# Patient Record
Sex: MALE | Race: WHITE | NOT HISPANIC OR LATINO | Employment: PART TIME | URBAN - METROPOLITAN AREA
[De-identification: names, ages, dates, MRNs, and addresses within clinical notes are randomized per-mention and may not be internally consistent; named-entity substitution may affect disease eponyms.]

---

## 2018-12-31 ENCOUNTER — HOSPITAL ENCOUNTER (INPATIENT)
Facility: HOSPITAL | Age: 48
LOS: 4 days | Discharge: HOME | DRG: 514 | End: 2019-01-04
Attending: FAMILY MEDICINE | Admitting: INTERNAL MEDICINE
Payer: MEDICAID

## 2018-12-31 DIAGNOSIS — L02.512 ABSCESS OF LEFT MIDDLE FINGER: Primary | ICD-10-CM

## 2018-12-31 DIAGNOSIS — L53.9 ERYTHEMA: ICD-10-CM

## 2018-12-31 DIAGNOSIS — M65.9 TENOSYNOVITIS OF FINGER: ICD-10-CM

## 2018-12-31 PROBLEM — Z72.0 TOBACCO ABUSE: Status: ACTIVE | Noted: 2018-12-31

## 2018-12-31 LAB
ANION GAP SERPL CALC-SCNC: 11 MMOL/L
APTT BLDCRRT: 27.1 SEC
BASOPHILS # BLD AUTO: 0.02 K/UL
BASOPHILS NFR BLD: 0.2 %
BUN SERPL-MCNC: 10 MG/DL
CALCIUM SERPL-MCNC: 9.3 MG/DL
CHLORIDE SERPL-SCNC: 108 MMOL/L
CO2 SERPL-SCNC: 22 MMOL/L
CREAT SERPL-MCNC: 0.8 MG/DL
DIFFERENTIAL METHOD: ABNORMAL
EOSINOPHIL # BLD AUTO: 0.3 K/UL
EOSINOPHIL NFR BLD: 3.1 %
ERYTHROCYTE [DISTWIDTH] IN BLOOD BY AUTOMATED COUNT: 13.5 %
EST. GFR  (AFRICAN AMERICAN): >60 ML/MIN/1.73 M^2
EST. GFR  (NON AFRICAN AMERICAN): >60 ML/MIN/1.73 M^2
GLUCOSE SERPL-MCNC: 93 MG/DL
HCT VFR BLD AUTO: 40.6 %
HGB BLD-MCNC: 13.6 G/DL
INR PPP: 0.9
LYMPHOCYTES # BLD AUTO: 1.2 K/UL
LYMPHOCYTES NFR BLD: 11.7 %
MCH RBC QN AUTO: 30 PG
MCHC RBC AUTO-ENTMCNC: 33.5 G/DL
MCV RBC AUTO: 89 FL
MONOCYTES # BLD AUTO: 1.1 K/UL
MONOCYTES NFR BLD: 10.8 %
NEUTROPHILS # BLD AUTO: 7.3 K/UL
NEUTROPHILS NFR BLD: 74.2 %
PLATELET # BLD AUTO: 217 K/UL
PMV BLD AUTO: 9 FL
POTASSIUM SERPL-SCNC: 4.3 MMOL/L
PROTHROMBIN TIME: 9.6 SEC
RBC # BLD AUTO: 4.54 M/UL
SODIUM SERPL-SCNC: 141 MMOL/L
WBC # BLD AUTO: 9.87 K/UL

## 2018-12-31 PROCEDURE — 63600175 PHARM REV CODE 636 W HCPCS: Performed by: INTERNAL MEDICINE

## 2018-12-31 PROCEDURE — 25000003 PHARM REV CODE 250: Performed by: PHYSICIAN ASSISTANT

## 2018-12-31 PROCEDURE — 90714 TD VACC NO PRESV 7 YRS+ IM: CPT | Performed by: PHYSICIAN ASSISTANT

## 2018-12-31 PROCEDURE — 63600175 PHARM REV CODE 636 W HCPCS: Performed by: PHYSICIAN ASSISTANT

## 2018-12-31 PROCEDURE — 96375 TX/PRO/DX INJ NEW DRUG ADDON: CPT | Mod: 59

## 2018-12-31 PROCEDURE — 96372 THER/PROPH/DIAG INJ SC/IM: CPT | Mod: 59

## 2018-12-31 PROCEDURE — 80048 BASIC METABOLIC PNL TOTAL CA: CPT

## 2018-12-31 PROCEDURE — 85610 PROTHROMBIN TIME: CPT

## 2018-12-31 PROCEDURE — 90471 IMMUNIZATION ADMIN: CPT | Performed by: PHYSICIAN ASSISTANT

## 2018-12-31 PROCEDURE — 99285 EMERGENCY DEPT VISIT HI MDM: CPT | Mod: 25

## 2018-12-31 PROCEDURE — 25000003 PHARM REV CODE 250: Performed by: INTERNAL MEDICINE

## 2018-12-31 PROCEDURE — S0020 INJECTION, BUPIVICAINE HYDRO: HCPCS | Performed by: PHYSICIAN ASSISTANT

## 2018-12-31 PROCEDURE — 63600175 PHARM REV CODE 636 W HCPCS: Performed by: NURSE PRACTITIONER

## 2018-12-31 PROCEDURE — 85730 THROMBOPLASTIN TIME PARTIAL: CPT

## 2018-12-31 PROCEDURE — 11000001 HC ACUTE MED/SURG PRIVATE ROOM

## 2018-12-31 PROCEDURE — 85025 COMPLETE CBC W/AUTO DIFF WBC: CPT

## 2018-12-31 PROCEDURE — 96365 THER/PROPH/DIAG IV INF INIT: CPT | Mod: 59

## 2018-12-31 PROCEDURE — 25000003 PHARM REV CODE 250: Performed by: NURSE PRACTITIONER

## 2018-12-31 PROCEDURE — 10061 I&D ABSCESS COMP/MULTIPLE: CPT

## 2018-12-31 PROCEDURE — 25500020 PHARM REV CODE 255: Performed by: FAMILY MEDICINE

## 2018-12-31 RX ORDER — VANCOMYCIN HCL IN 5 % DEXTROSE 1G/250ML
1000 PLASTIC BAG, INJECTION (ML) INTRAVENOUS
Status: COMPLETED | OUTPATIENT
Start: 2018-12-31 | End: 2018-12-31

## 2018-12-31 RX ORDER — BUPIVACAINE HYDROCHLORIDE 5 MG/ML
5 INJECTION, SOLUTION EPIDURAL; INTRACAUDAL
Status: COMPLETED | OUTPATIENT
Start: 2018-12-31 | End: 2018-12-31

## 2018-12-31 RX ORDER — IBUPROFEN 200 MG
1 TABLET ORAL DAILY
Status: DISCONTINUED | OUTPATIENT
Start: 2018-12-31 | End: 2019-01-04 | Stop reason: HOSPADM

## 2018-12-31 RX ORDER — MORPHINE SULFATE 2 MG/ML
2 INJECTION, SOLUTION INTRAMUSCULAR; INTRAVENOUS EVERY 4 HOURS PRN
Status: DISCONTINUED | OUTPATIENT
Start: 2018-12-31 | End: 2018-12-31 | Stop reason: SDUPTHER

## 2018-12-31 RX ORDER — HYDROMORPHONE HYDROCHLORIDE 2 MG/ML
1 INJECTION, SOLUTION INTRAMUSCULAR; INTRAVENOUS; SUBCUTANEOUS
Status: COMPLETED | OUTPATIENT
Start: 2018-12-31 | End: 2018-12-31

## 2018-12-31 RX ORDER — ACETAMINOPHEN 325 MG/1
650 TABLET ORAL EVERY 8 HOURS PRN
Status: DISCONTINUED | OUTPATIENT
Start: 2018-12-31 | End: 2019-01-04 | Stop reason: HOSPADM

## 2018-12-31 RX ORDER — LEVOFLOXACIN 5 MG/ML
500 INJECTION, SOLUTION INTRAVENOUS
Status: DISCONTINUED | OUTPATIENT
Start: 2018-12-31 | End: 2019-01-02

## 2018-12-31 RX ORDER — ONDANSETRON 2 MG/ML
4 INJECTION INTRAMUSCULAR; INTRAVENOUS
Status: COMPLETED | OUTPATIENT
Start: 2018-12-31 | End: 2018-12-31

## 2018-12-31 RX ORDER — HYDROMORPHONE HYDROCHLORIDE 2 MG/ML
1 INJECTION, SOLUTION INTRAMUSCULAR; INTRAVENOUS; SUBCUTANEOUS EVERY 4 HOURS PRN
Status: DISCONTINUED | OUTPATIENT
Start: 2018-12-31 | End: 2019-01-01

## 2018-12-31 RX ORDER — ONDANSETRON 2 MG/ML
4 INJECTION INTRAMUSCULAR; INTRAVENOUS EVERY 8 HOURS PRN
Status: DISCONTINUED | OUTPATIENT
Start: 2018-12-31 | End: 2019-01-02

## 2018-12-31 RX ADMIN — HYDROMORPHONE HYDROCHLORIDE 1 MG: 2 INJECTION INTRAMUSCULAR; INTRAVENOUS; SUBCUTANEOUS at 11:12

## 2018-12-31 RX ADMIN — VANCOMYCIN HYDROCHLORIDE 750 MG: 750 INJECTION, POWDER, LYOPHILIZED, FOR SOLUTION INTRAVENOUS at 11:12

## 2018-12-31 RX ADMIN — VANCOMYCIN HYDROCHLORIDE 1000 MG: 1 INJECTION, POWDER, LYOPHILIZED, FOR SOLUTION INTRAVENOUS at 11:12

## 2018-12-31 RX ADMIN — HYDROMORPHONE HYDROCHLORIDE 1 MG: 2 INJECTION INTRAMUSCULAR; INTRAVENOUS; SUBCUTANEOUS at 07:12

## 2018-12-31 RX ADMIN — BUPIVACAINE HYDROCHLORIDE 25 MG: 5 INJECTION, SOLUTION EPIDURAL; INTRACAUDAL; PERINEURAL at 02:12

## 2018-12-31 RX ADMIN — IOHEXOL 50 ML: 350 INJECTION, SOLUTION INTRAVENOUS at 12:12

## 2018-12-31 RX ADMIN — ONDANSETRON 4 MG: 2 INJECTION, SOLUTION INTRAMUSCULAR; INTRAVENOUS at 11:12

## 2018-12-31 RX ADMIN — ONDANSETRON 4 MG: 2 INJECTION INTRAMUSCULAR; INTRAVENOUS at 10:12

## 2018-12-31 RX ADMIN — LEVOFLOXACIN 500 MG: 500 INJECTION, SOLUTION INTRAVENOUS at 03:12

## 2018-12-31 RX ADMIN — ACETAMINOPHEN 650 MG: 325 TABLET ORAL at 10:12

## 2018-12-31 RX ADMIN — CLOSTRIDIUM TETANI TOXOID ANTIGEN (FORMALDEHYDE INACTIVATED) AND CORYNEBACTERIUM DIPHTHERIAE TOXOID ANTIGEN (FORMALDEHYDE INACTIVATED) 0.5 ML: 5; 2 INJECTION, SUSPENSION INTRAMUSCULAR at 02:12

## 2018-12-31 NOTE — SUBJECTIVE & OBJECTIVE
History reviewed. No pertinent past medical history.    Past Surgical History:   Procedure Laterality Date    HERNIA REPAIR         Review of patient's allergies indicates:   Allergen Reactions    Pcn [penicillins] Hives    Sulfa (sulfonamide antibiotics) Hives       No current facility-administered medications on file prior to encounter.      No current outpatient medications on file prior to encounter.     Family History     None        Tobacco Use    Smoking status: Current Every Day Smoker     Packs/day: 1.00     Types: Cigarettes    Smokeless tobacco: Never Used   Substance and Sexual Activity    Alcohol use: No     Frequency: Never    Drug use: No    Sexual activity: Not on file     Review of Systems   Constitutional: Negative for chills, diaphoresis, fatigue and fever.   HENT: Negative for drooling, ear pain, rhinorrhea and sore throat.    Eyes: Negative.    Respiratory: Negative for cough, shortness of breath and wheezing.    Cardiovascular: Negative for palpitations and leg swelling.   Gastrointestinal: Negative for abdominal pain, constipation, diarrhea and nausea.   Endocrine: Negative.    Genitourinary: Negative for dysuria, hematuria and urgency.   Musculoskeletal: Positive for joint swelling.        Left middle finger swelling and tenderness     Skin: Negative for color change and wound.   Allergic/Immunologic: Negative.    Neurological: Negative for dizziness, syncope and speech difficulty.   Hematological: Negative.    Psychiatric/Behavioral: Negative.      Objective:     Vital Signs (Most Recent):  Temp: 97.9 °F (36.6 °C) (12/31/18 1040)  Pulse: 73 (12/31/18 1140)  Resp: 18 (12/31/18 1140)  BP: 128/83 (12/31/18 1140)  SpO2: 97 % (12/31/18 1140) Vital Signs (24h Range):  Temp:  [97.9 °F (36.6 °C)] 97.9 °F (36.6 °C)  Pulse:  [73-81] 73  Resp:  [18-20] 18  SpO2:  [97 %-98 %] 97 %  BP: (128-148)/(83) 128/83     Weight: 54 kg (119 lb 0.8 oz)  Body mass index is 19.21 kg/m².    Physical Exam    Constitutional: He is oriented to person, place, and time. He appears well-developed and well-nourished. No distress.   HENT:   Head: Normocephalic and atraumatic.   Eyes: EOM are normal.   Neck: Normal range of motion. Neck supple.   Cardiovascular: Normal rate, regular rhythm and normal heart sounds.   Pulmonary/Chest: Effort normal and breath sounds normal. No respiratory distress.   Abdominal: Soft. Bowel sounds are normal. He exhibits no distension. There is no tenderness.   Musculoskeletal: Normal range of motion. He exhibits no edema.   Left middle finger swelling and tenderness.      Neurological: He is alert and oriented to person, place, and time.   Skin: Skin is dry.   Nursing note and vitals reviewed.        CRANIAL NERVES     CN III, IV, VI   Extraocular motions are normal.        Significant Labs:   CBC:   Recent Labs   Lab 12/31/18  1107   WBC 9.87   HGB 13.6*   HCT 40.6        CMP:   Recent Labs   Lab 12/31/18  1107      K 4.3      CO2 22*   GLU 93   BUN 10   CREATININE 0.8   CALCIUM 9.3   ANIONGAP 11   EGFRNONAA >60       Significant Imaging:   Imaging Results          CT Hand With Contrast Left (Final result)  Result time 12/31/18 13:06:11    Final result by Dennis Gutierrez Jr., MD (12/31/18 13:06:11)                 Impression:      No acute bone findings.  Nothing obvious for osteomyelitis.  Two.  Focal area of soft tissue swelling along the ventral aspect of the long finger, with possibilities as above.    All CT scans at this facility are performed  using dose modulation techniques as appropriate to performed exam including the following:  automated exposure control; adjustment of mA and/or kV according to the patients size (this includes techniques or standardized protocols for targeted exams where dose is matched to indication/reason for exam: i.e. extremities or head);  iterative reconstruction technique.      Electronically signed by: Dennis Gutierrez  MD  Date:    12/31/2018  Time:    13:06             Narrative:    EXAMINATION:  CT HAND WITH CONTRAST LEFT    CLINICAL HISTORY:  Hand erythema, swelling, cellulitis suspected;r/o flexor tenosynovitis left 3rd finger;    TECHNIQUE:  Postcontrast images of the left hand/long finger were obtained.  50 cc Omnipaque 350 was administered.  Multiplanar reconstruction images were produced.    COMPARISON:  None    FINDINGS:  Bone alignment of the wrist and hand appears anatomic.  No fractures.  No bone destruction.  Carpal bones appear intact.  Benign area of sclerosis in the midpole of the scaphoid bone, likely a benign bone island.  No findings of obvious osteomyelitis.    Focal area of soft tissue prominence along the ventral portion of the middle phalanx of the long finger.  Soft tissue swelling is noted and at an anterior to the flexor tendon at this region.  Focal area of tenosynovitis is possible.  Soft tissue swelling, extends over length of approximately 1.5 cm.  Small phlegmon/early abscess is also possible.  Imaging 84 series 105, image 54, series 3 if additional imaging is indicated, consider MRI evaluation.

## 2018-12-31 NOTE — ED PROVIDER NOTES
"Encounter Date: 12/31/2018       History     Chief Complaint   Patient presents with    Hand Pain     Pt states,"I have something wrong with my left middle finger, it's swollen and hurting."     The history is provided by the patient.   Abscess    This is a new problem. The current episode started yesterday. The problem occurs rarely. The problem has been rapidly worsening. Affected Location: left 3rd finger. The pain is at a severity of 5/10. The abscess is characterized by redness, painfulness and swelling. Pertinent negatives include no fever, no diarrhea, no vomiting and no sore throat.     Review of patient's allergies indicates:   Allergen Reactions    Pcn [penicillins] Hives    Sulfa (sulfonamide antibiotics) Hives     No past medical history on file.  No past surgical history on file.  No family history on file.  Social History     Tobacco Use    Smoking status: Not on file   Substance Use Topics    Alcohol use: Not on file    Drug use: Not on file     Review of Systems   Constitutional: Negative for diaphoresis and fever.   HENT: Negative for sore throat.    Eyes: Negative for photophobia and redness.   Respiratory: Negative for shortness of breath.    Cardiovascular: Negative for chest pain.   Gastrointestinal: Negative for abdominal pain, constipation, diarrhea, nausea and vomiting.   Endocrine: Negative for polydipsia and polyphagia.   Genitourinary: Negative for dysuria and frequency.   Musculoskeletal: Negative for back pain.   Skin: Negative for rash.   Neurological: Negative for weakness.   Hematological: Does not bruise/bleed easily.   Psychiatric/Behavioral: The patient is not nervous/anxious.    All other systems reviewed and are negative.      Physical Exam     Initial Vitals [12/31/18 1040]   BP Pulse Resp Temp SpO2   (!) 148/83 81 20 97.9 °F (36.6 °C) 98 %      MAP       --         Physical Exam    Nursing note and vitals reviewed.  Constitutional: He appears well-developed and " well-nourished.   HENT:   Head: Normocephalic and atraumatic.   Right Ear: External ear normal.   Left Ear: External ear normal.   Nose: Nose normal.   Mouth/Throat: Oropharynx is clear and moist.   Eyes: Conjunctivae and EOM are normal. Pupils are equal, round, and reactive to light.   Neck: Normal range of motion. Neck supple.   Cardiovascular: Normal rate, regular rhythm, normal heart sounds and intact distal pulses.   Pulmonary/Chest: Breath sounds normal. No respiratory distress. He has no wheezes. He has no rhonchi. He has no rales.   Abdominal: Soft. Bowel sounds are normal. He exhibits no distension. There is no tenderness. There is no rebound and no guarding.   Musculoskeletal:        Left hand: He exhibits decreased range of motion, tenderness and swelling. He exhibits normal two-point discrimination, normal capillary refill, no deformity and no laceration. Normal sensation noted. Normal strength noted.   2 cm x 1 cm area of Fluctuant, erythematous, tender swelling to the palmar aspect of the left 3rd digit;  Pain with passive flexion of the left 3rd finger   Neurological: He is alert and oriented to person, place, and time. He has normal strength.   Skin: Skin is warm and dry.   Psychiatric: He has a normal mood and affect. His behavior is normal. Judgment and thought content normal.         ED Course   I & D - Incision and Drainage  Date/Time: 12/31/2018 2:34 PM  Performed by: MARKO Etienne  Authorized by: Shania Orozco MD   Type: abscess  Body area: upper extremity  Location details: left long finger  Anesthesia: digital block    Anesthesia:  Local Anesthetic: bupivacaine 0.5% without epinephrine  Scalpel size: 11  Incision type: single straight  Complexity: complex  Drainage: pus  Drainage amount: scant  Wound treatment: incision,  deloculation,  drain placed,  expression of material,  wound packed and  drainage  Packing material: 1/4 in gauze  Complications: No  Patient tolerance:  Patient tolerated the procedure well with no immediate complications        Labs Reviewed - No data to display       Imaging Results    None                               Clinical Impression:   The primary encounter diagnosis was Abscess of left middle finger. A diagnosis of Tenosynovitis of finger was also pertinent to this visit.                             MARKO Etienne  12/31/18 [id number]

## 2018-12-31 NOTE — H&P
"[facility] Hospital Medicine  History & Physical    Patient Name: Aniceto Johansen  MRN: [id number]  Admission Date: 12/31/2018  Attending Physician: Shania Orozco MD   Primary Care Provider: Primary Doctor No      Patient information was obtained from patient and ER records.     Subjective:     Principal Problem:Abscess of left middle finger    Chief Complaint:   Chief Complaint   Patient presents with    Hand Pain     Pt states,"I have something wrong with my left middle finger, it's swollen and hurting."        HPI: Aniceto Johansen is a 48 year old male with history of tobacco abuse who presents to ED with left middle swelling and pain. Symptoms began one day ago and has progressively worsened. He denies injury or trauma to affected extremity. He smokes 1 ppd. Denies ETOH or recreational use.     In ED, CT Hand reflected middle finger soft tissue swelling involving anterior tendon with possible early abscess. He is about to undergo bedside I&D. Orthopedic surgery has been consulted.       History reviewed. No pertinent past medical history.    Past Surgical History:   Procedure Laterality Date    HERNIA REPAIR         Review of patient's allergies indicates:   Allergen Reactions    Pcn [penicillins] Hives    Sulfa (sulfonamide antibiotics) Hives       No current facility-administered medications on file prior to encounter.      No current outpatient medications on file prior to encounter.     Family History     None        Tobacco Use    Smoking status: Current Every Day Smoker     Packs/day: 1.00     Types: Cigarettes    Smokeless tobacco: Never Used   Substance and Sexual Activity    Alcohol use: No     Frequency: Never    Drug use: No    Sexual activity: Not on file     Review of Systems   Constitutional: Negative for chills, diaphoresis, fatigue and fever.   HENT: Negative for drooling, ear pain, rhinorrhea and sore throat.    Eyes: Negative.    Respiratory: Negative for " cough, shortness of breath and wheezing.    Cardiovascular: Negative for palpitations and leg swelling.   Gastrointestinal: Negative for abdominal pain, constipation, diarrhea and nausea.   Endocrine: Negative.    Genitourinary: Negative for dysuria, hematuria and urgency.   Musculoskeletal: Positive for joint swelling.        Left middle finger swelling and tenderness     Skin: Negative for color change and wound.   Allergic/Immunologic: Negative.    Neurological: Negative for dizziness, syncope and speech difficulty.   Hematological: Negative.    Psychiatric/Behavioral: Negative.      Objective:     Vital Signs (Most Recent):  Temp: 97.9 °F (36.6 °C) (12/31/18 1040)  Pulse: 73 (12/31/18 1140)  Resp: 18 (12/31/18 1140)  BP: 128/83 (12/31/18 1140)  SpO2: 97 % (12/31/18 1140) Vital Signs (24h Range):  Temp:  [97.9 °F (36.6 °C)] 97.9 °F (36.6 °C)  Pulse:  [73-81] 73  Resp:  [18-20] 18  SpO2:  [97 %-98 %] 97 %  BP: (128-148)/(83) 128/83     Weight: 54 kg (119 lb 0.8 oz)  Body mass index is 19.21 kg/m².    Physical Exam   Constitutional: He is oriented to person, place, and time. He appears well-developed and well-nourished. No distress.   HENT:   Head: Normocephalic and atraumatic.   Eyes: EOM are normal.   Neck: Normal range of motion. Neck supple.   Cardiovascular: Normal rate, regular rhythm and normal heart sounds.   Pulmonary/Chest: Effort normal and breath sounds normal. No respiratory distress.   Abdominal: Soft. Bowel sounds are normal. He exhibits no distension. There is no tenderness.   Musculoskeletal: Normal range of motion. He exhibits no edema.   Left middle finger swelling and tenderness.      Neurological: He is alert and oriented to person, place, and time.   Skin: Skin is dry.   Nursing note and vitals reviewed.        CRANIAL NERVES     CN III, IV, VI   Extraocular motions are normal.        Significant Labs:   CBC:   Recent Labs   Lab 12/31/18  1107   WBC 9.87   HGB 13.6*   HCT 40.6         CMP:   Recent Labs   Lab 12/31/18  1107      K 4.3      CO2 22*   GLU 93   BUN 10   CREATININE 0.8   CALCIUM 9.3   ANIONGAP 11   EGFRNONAA >60       Significant Imaging:   Imaging Results          CT Hand With Contrast Left (Final result)  Result time 12/31/18 13:06:11    Final result by Dennis Gutierrez Jr., MD (12/31/18 13:06:11)                 Impression:      No acute bone findings.  Nothing obvious for osteomyelitis.  Two.  Focal area of soft tissue swelling along the ventral aspect of the long finger, with possibilities as above.    All CT scans at this facility are performed  using dose modulation techniques as appropriate to performed exam including the following:  automated exposure control; adjustment of mA and/or kV according to the patients size (this includes techniques or standardized protocols for targeted exams where dose is matched to indication/reason for exam: i.e. extremities or head);  iterative reconstruction technique.      Electronically signed by: Dennis Gutierrez MD  Date:    12/31/2018  Time:    13:06             Narrative:    EXAMINATION:  CT HAND WITH CONTRAST LEFT    CLINICAL HISTORY:  Hand erythema, swelling, cellulitis suspected;r/o flexor tenosynovitis left 3rd finger;    TECHNIQUE:  Postcontrast images of the left hand/long finger were obtained.  50 cc Omnipaque 350 was administered.  Multiplanar reconstruction images were produced.    COMPARISON:  None    FINDINGS:  Bone alignment of the wrist and hand appears anatomic.  No fractures.  No bone destruction.  Carpal bones appear intact.  Benign area of sclerosis in the midpole of the scaphoid bone, likely a benign bone island.  No findings of obvious osteomyelitis.    Focal area of soft tissue prominence along the ventral portion of the middle phalanx of the long finger.  Soft tissue swelling is noted and at an anterior to the flexor tendon at this region.  Focal area of tenosynovitis is possible.  Soft tissue  swelling, extends over length of approximately 1.5 cm.  Small phlegmon/early abscess is also possible.  Imaging 84 series 105, image 54, series 3 if additional imaging is indicated, consider MRI evaluation.                                  Assessment/Plan:     * Abscess of left middle finger    -CT showed concern for early tenosynovitis and abscess. Currently having bedside I&D. Await wound cultures  -Continue Vancomycin. Start Levaquin  -neurovascular checks  -Orthopedic Surgery following  -pain managment     Tobacco abuse    -tobacco abuse         VTE Risk Mitigation (From admission, onward)    Ambulation  SCD          Rivka Huber NP  Department of Hospital Medicine   [facility]

## 2018-12-31 NOTE — HPI
Aniceto Johansen is a 48 year old male with history of tobacco abuse who presents to ED with left middle swelling and pain. Symptoms began one day ago and has progressively worsened. He denies injury or trauma to affected extremity. He smokes 1 ppd. Denies ETOH or recreational use.     In ED, CT Hand reflected middle finger soft tissue swelling involving anterior tendon with possible early abscess. He is about to undergo bedside I&D. Orthopedic surgery has been consulted.

## 2018-12-31 NOTE — ASSESSMENT & PLAN NOTE
-CT showed concern for early tenosynovitis and abscess. Currently having bedside I&D. Await wound cultures  -Continue Vancomycin. Start Levaquin  -neurovascular checks  -Orthopedic Surgery following  -pain managment

## 2018-12-31 NOTE — PLAN OF CARE
Problem: Adult Inpatient Plan of Care  Goal: Plan of Care Review  Outcome: Ongoing (interventions implemented as appropriate)  Fall precautions maintained. Pt free from falls/injuries.  Patient complains of pain. Pain controlled with PRN meds.  Antibiotics given as prescribed.  Ambulates and repositions independently.  Plan of care and medications discussed with patient.  Patient verbalized understanding.  Bed locked and low, call bell within reach.  Chart check done. Will continue to monitor.

## 2019-01-01 ENCOUNTER — ANESTHESIA (OUTPATIENT)
Dept: SURGERY | Facility: HOSPITAL | Age: 49
DRG: 514 | End: 2019-01-01
Payer: MEDICAID

## 2019-01-01 ENCOUNTER — ANESTHESIA EVENT (OUTPATIENT)
Dept: SURGERY | Facility: HOSPITAL | Age: 49
DRG: 514 | End: 2019-01-01
Payer: MEDICAID

## 2019-01-01 LAB — VANCOMYCIN TROUGH SERPL-MCNC: 7.3 UG/ML

## 2019-01-01 PROCEDURE — 25000003 PHARM REV CODE 250: Performed by: NURSE ANESTHETIST, CERTIFIED REGISTERED

## 2019-01-01 PROCEDURE — 26011 DRAINAGE OF FINGER ABSCESS: CPT | Mod: F2,,, | Performed by: ORTHOPAEDIC SURGERY

## 2019-01-01 PROCEDURE — 63600175 PHARM REV CODE 636 W HCPCS: Performed by: PHYSICIAN ASSISTANT

## 2019-01-01 PROCEDURE — 63600175 PHARM REV CODE 636 W HCPCS: Performed by: NURSE ANESTHETIST, CERTIFIED REGISTERED

## 2019-01-01 PROCEDURE — 37000008 HC ANESTHESIA 1ST 15 MINUTES: Performed by: ORTHOPAEDIC SURGERY

## 2019-01-01 PROCEDURE — 99233 SBSQ HOSP IP/OBS HIGH 50: CPT | Mod: 57,,, | Performed by: ORTHOPAEDIC SURGERY

## 2019-01-01 PROCEDURE — 87070 CULTURE OTHR SPECIMN AEROBIC: CPT | Mod: 59

## 2019-01-01 PROCEDURE — 11043 PR DEBRIDEMENT, SKIN, SUB-Q TISSUE,MUSCLE,=<20 SQ CM: ICD-10-PCS | Mod: 51,,, | Performed by: ORTHOPAEDIC SURGERY

## 2019-01-01 PROCEDURE — 63600175 PHARM REV CODE 636 W HCPCS: Performed by: NURSE PRACTITIONER

## 2019-01-01 PROCEDURE — 36415 COLL VENOUS BLD VENIPUNCTURE: CPT

## 2019-01-01 PROCEDURE — 11000001 HC ACUTE MED/SURG PRIVATE ROOM

## 2019-01-01 PROCEDURE — A9585 GADOBUTROL INJECTION: HCPCS | Performed by: INTERNAL MEDICINE

## 2019-01-01 PROCEDURE — 83036 HEMOGLOBIN GLYCOSYLATED A1C: CPT

## 2019-01-01 PROCEDURE — 71000033 HC RECOVERY, INTIAL HOUR: Performed by: ORTHOPAEDIC SURGERY

## 2019-01-01 PROCEDURE — 11043 DBRDMT MUSC&/FSCA 1ST 20/<: CPT | Mod: 51,,, | Performed by: ORTHOPAEDIC SURGERY

## 2019-01-01 PROCEDURE — 25000003 PHARM REV CODE 250: Performed by: NURSE PRACTITIONER

## 2019-01-01 PROCEDURE — 63600175 PHARM REV CODE 636 W HCPCS: Performed by: HOSPITALIST

## 2019-01-01 PROCEDURE — 25000003 PHARM REV CODE 250: Performed by: INTERNAL MEDICINE

## 2019-01-01 PROCEDURE — 26011 PR DRAIN FINGER ABSCESS,COMPLICATED: ICD-10-PCS | Mod: F2,,, | Performed by: ORTHOPAEDIC SURGERY

## 2019-01-01 PROCEDURE — 25000003 PHARM REV CODE 250: Performed by: HOSPITALIST

## 2019-01-01 PROCEDURE — 63600175 PHARM REV CODE 636 W HCPCS: Performed by: ANESTHESIOLOGY

## 2019-01-01 PROCEDURE — 36000706: Performed by: ORTHOPAEDIC SURGERY

## 2019-01-01 PROCEDURE — 63600175 PHARM REV CODE 636 W HCPCS: Performed by: INTERNAL MEDICINE

## 2019-01-01 PROCEDURE — 25500020 PHARM REV CODE 255: Performed by: INTERNAL MEDICINE

## 2019-01-01 PROCEDURE — 99233 PR SUBSEQUENT HOSPITAL CARE,LEVL III: ICD-10-PCS | Mod: 57,,, | Performed by: ORTHOPAEDIC SURGERY

## 2019-01-01 PROCEDURE — 87186 SC STD MICRODIL/AGAR DIL: CPT | Mod: 59

## 2019-01-01 PROCEDURE — 36000707: Performed by: ORTHOPAEDIC SURGERY

## 2019-01-01 PROCEDURE — 80202 ASSAY OF VANCOMYCIN: CPT

## 2019-01-01 PROCEDURE — 37000009 HC ANESTHESIA EA ADD 15 MINS: Performed by: ORTHOPAEDIC SURGERY

## 2019-01-01 PROCEDURE — 87077 CULTURE AEROBIC IDENTIFY: CPT

## 2019-01-01 RX ORDER — METOCLOPRAMIDE HYDROCHLORIDE 5 MG/ML
10 INJECTION INTRAMUSCULAR; INTRAVENOUS ONCE
Status: COMPLETED | OUTPATIENT
Start: 2019-01-01 | End: 2019-01-01

## 2019-01-01 RX ORDER — LIDOCAINE HCL/PF 100 MG/5ML
SYRINGE (ML) INTRAVENOUS
Status: DISCONTINUED | OUTPATIENT
Start: 2019-01-01 | End: 2019-01-01

## 2019-01-01 RX ORDER — ROCURONIUM BROMIDE 10 MG/ML
INJECTION, SOLUTION INTRAVENOUS
Status: DISCONTINUED | OUTPATIENT
Start: 2019-01-01 | End: 2019-01-01

## 2019-01-01 RX ORDER — GADOBUTROL 604.72 MG/ML
6 INJECTION INTRAVENOUS
Status: COMPLETED | OUTPATIENT
Start: 2019-01-01 | End: 2019-01-01

## 2019-01-01 RX ORDER — SODIUM CHLORIDE, SODIUM LACTATE, POTASSIUM CHLORIDE, CALCIUM CHLORIDE 600; 310; 30; 20 MG/100ML; MG/100ML; MG/100ML; MG/100ML
INJECTION, SOLUTION INTRAVENOUS CONTINUOUS PRN
Status: DISCONTINUED | OUTPATIENT
Start: 2019-01-01 | End: 2019-01-01

## 2019-01-01 RX ORDER — MEPERIDINE HYDROCHLORIDE 50 MG/ML
12.5 INJECTION INTRAMUSCULAR; INTRAVENOUS; SUBCUTANEOUS ONCE AS NEEDED
Status: COMPLETED | OUTPATIENT
Start: 2019-01-01 | End: 2019-01-01

## 2019-01-01 RX ORDER — ONDANSETRON 2 MG/ML
INJECTION INTRAMUSCULAR; INTRAVENOUS
Status: DISCONTINUED | OUTPATIENT
Start: 2019-01-01 | End: 2019-01-01

## 2019-01-01 RX ORDER — DIPHENHYDRAMINE HYDROCHLORIDE 50 MG/ML
25 INJECTION INTRAMUSCULAR; INTRAVENOUS EVERY 6 HOURS PRN
Status: DISCONTINUED | OUTPATIENT
Start: 2019-01-01 | End: 2019-01-01 | Stop reason: RX

## 2019-01-01 RX ORDER — HYDROCODONE BITARTRATE AND ACETAMINOPHEN 7.5; 325 MG/1; MG/1
1 TABLET ORAL EVERY 6 HOURS PRN
Status: DISCONTINUED | OUTPATIENT
Start: 2019-01-01 | End: 2019-01-01

## 2019-01-01 RX ORDER — SODIUM CHLORIDE 0.9 % (FLUSH) 0.9 %
3 SYRINGE (ML) INJECTION
Status: DISCONTINUED | OUTPATIENT
Start: 2019-01-01 | End: 2019-01-04 | Stop reason: HOSPADM

## 2019-01-01 RX ORDER — OXYCODONE AND ACETAMINOPHEN 7.5; 325 MG/1; MG/1
1 TABLET ORAL EVERY 4 HOURS PRN
Status: DISCONTINUED | OUTPATIENT
Start: 2019-01-01 | End: 2019-01-04 | Stop reason: HOSPADM

## 2019-01-01 RX ORDER — PROPOFOL 10 MG/ML
VIAL (ML) INTRAVENOUS
Status: DISCONTINUED | OUTPATIENT
Start: 2019-01-01 | End: 2019-01-01

## 2019-01-01 RX ORDER — SUCCINYLCHOLINE CHLORIDE 20 MG/ML
INJECTION INTRAMUSCULAR; INTRAVENOUS
Status: DISCONTINUED | OUTPATIENT
Start: 2019-01-01 | End: 2019-01-01

## 2019-01-01 RX ORDER — MIDAZOLAM HYDROCHLORIDE 1 MG/ML
INJECTION, SOLUTION INTRAMUSCULAR; INTRAVENOUS
Status: DISCONTINUED | OUTPATIENT
Start: 2019-01-01 | End: 2019-01-01

## 2019-01-01 RX ORDER — TRAMADOL HYDROCHLORIDE 50 MG/1
50 TABLET ORAL EVERY 6 HOURS PRN
Status: DISCONTINUED | OUTPATIENT
Start: 2019-01-01 | End: 2019-01-04 | Stop reason: HOSPADM

## 2019-01-01 RX ORDER — MORPHINE SULFATE 4 MG/ML
2 INJECTION, SOLUTION INTRAMUSCULAR; INTRAVENOUS EVERY 5 MIN PRN
Status: DISCONTINUED | OUTPATIENT
Start: 2019-01-01 | End: 2019-01-02

## 2019-01-01 RX ORDER — DEXAMETHASONE SODIUM PHOSPHATE 4 MG/ML
INJECTION, SOLUTION INTRA-ARTICULAR; INTRALESIONAL; INTRAMUSCULAR; INTRAVENOUS; SOFT TISSUE
Status: DISCONTINUED | OUTPATIENT
Start: 2019-01-01 | End: 2019-01-01

## 2019-01-01 RX ORDER — FENTANYL CITRATE 50 UG/ML
INJECTION, SOLUTION INTRAMUSCULAR; INTRAVENOUS
Status: DISCONTINUED | OUTPATIENT
Start: 2019-01-01 | End: 2019-01-01

## 2019-01-01 RX ORDER — ONDANSETRON 2 MG/ML
4 INJECTION INTRAMUSCULAR; INTRAVENOUS DAILY PRN
Status: DISCONTINUED | OUTPATIENT
Start: 2019-01-01 | End: 2019-01-02

## 2019-01-01 RX ADMIN — MORPHINE SULFATE 2 MG: 4 INJECTION INTRAVENOUS at 04:01

## 2019-01-01 RX ADMIN — VANCOMYCIN HYDROCHLORIDE 750 MG: 750 INJECTION, POWDER, LYOPHILIZED, FOR SOLUTION INTRAVENOUS at 10:01

## 2019-01-01 RX ADMIN — METOCLOPRAMIDE 10 MG: 5 INJECTION, SOLUTION INTRAMUSCULAR; INTRAVENOUS at 03:01

## 2019-01-01 RX ADMIN — SUCCINYLCHOLINE CHLORIDE 140 MG: 20 INJECTION, SOLUTION INTRAMUSCULAR; INTRAVENOUS at 03:01

## 2019-01-01 RX ADMIN — HYDROCODONE BITARTRATE AND ACETAMINOPHEN 1 TABLET: 7.5; 325 TABLET ORAL at 10:01

## 2019-01-01 RX ADMIN — PROPOFOL 100 MG: 10 INJECTION, EMULSION INTRAVENOUS at 03:01

## 2019-01-01 RX ADMIN — DEXAMETHASONE SODIUM PHOSPHATE 8 MG: 4 INJECTION, SOLUTION INTRA-ARTICULAR; INTRALESIONAL; INTRAMUSCULAR; INTRAVENOUS; SOFT TISSUE at 03:01

## 2019-01-01 RX ADMIN — ONDANSETRON 4 MG: 2 INJECTION INTRAMUSCULAR; INTRAVENOUS at 09:01

## 2019-01-01 RX ADMIN — MEPERIDINE HYDROCHLORIDE 12.5 MG: 50 INJECTION INTRAMUSCULAR; INTRAVENOUS; SUBCUTANEOUS at 04:01

## 2019-01-01 RX ADMIN — LEVOFLOXACIN 500 MG: 500 INJECTION, SOLUTION INTRAVENOUS at 03:01

## 2019-01-01 RX ADMIN — LIDOCAINE HYDROCHLORIDE 100 MG: 20 INJECTION, SOLUTION INTRAVENOUS at 03:01

## 2019-01-01 RX ADMIN — ONDANSETRON 4 MG: 2 INJECTION INTRAMUSCULAR; INTRAVENOUS at 01:01

## 2019-01-01 RX ADMIN — OXYCODONE HYDROCHLORIDE AND ACETAMINOPHEN 1 TABLET: 7.5; 325 TABLET ORAL at 06:01

## 2019-01-01 RX ADMIN — SODIUM CHLORIDE, SODIUM LACTATE, POTASSIUM CHLORIDE, AND CALCIUM CHLORIDE: .6; .31; .03; .02 INJECTION, SOLUTION INTRAVENOUS at 03:01

## 2019-01-01 RX ADMIN — FENTANYL CITRATE 50 MCG: 50 INJECTION, SOLUTION INTRAMUSCULAR; INTRAVENOUS at 03:01

## 2019-01-01 RX ADMIN — GADOBUTROL 6 ML: 604.72 INJECTION INTRAVENOUS at 02:01

## 2019-01-01 RX ADMIN — ONDANSETRON 4 MG: 2 INJECTION, SOLUTION INTRAMUSCULAR; INTRAVENOUS at 03:01

## 2019-01-01 RX ADMIN — HYDROCODONE BITARTRATE AND ACETAMINOPHEN 1 TABLET: 7.5; 325 TABLET ORAL at 03:01

## 2019-01-01 RX ADMIN — ROCURONIUM BROMIDE 5 MG: 10 INJECTION, SOLUTION INTRAVENOUS at 03:01

## 2019-01-01 RX ADMIN — MIDAZOLAM 2 MG: 1 INJECTION INTRAMUSCULAR; INTRAVENOUS at 03:01

## 2019-01-01 RX ADMIN — PROPOFOL 20 MG: 10 INJECTION, EMULSION INTRAVENOUS at 03:01

## 2019-01-01 NOTE — ANESTHESIA POSTPROCEDURE EVALUATION
"Anesthesia Post Evaluation    Patient: Aniceto Johansen    Procedure(s) Performed: Procedure(s) (LRB):  INCISION AND DRAINAGE, UPPER EXTREMITY (Left)    Final Anesthesia Type: general  Patient location during evaluation: PACU  Patient participation: Yes- Able to Participate  Level of consciousness: awake and alert  Post-procedure vital signs: reviewed and stable  Pain management: adequate  Airway patency: patent  PONV status at discharge: No PONV  Anesthetic complications: no      Cardiovascular status: blood pressure returned to baseline  Respiratory status: unassisted  Hydration status: euvolemic  Follow-up not needed.        Visit Vitals  /69 (Patient Position: Lying)   Pulse 74   Temp 37.2 °C (98.9 °F) (Oral)   Resp 14   Ht 5' 6" (1.676 m)   Wt 54 kg (119 lb 0.8 oz)   SpO2 (!) 94%   BMI 19.21 kg/m²       Pain/Kyleigh Score: Pain Rating Prior to Med Admin: 5 (1/1/2019  4:50 PM)  Pain Rating Post Med Admin: 2 (1/1/2019  4:05 AM)  Kyleigh Score: 9 (1/1/2019  4:45 PM)        "

## 2019-01-01 NOTE — PLAN OF CARE
Problem: Adult Inpatient Plan of Care  Goal: Plan of Care Review  Outcome: Ongoing (interventions implemented as appropriate)  Pt remained free of injury during shift, stable condition, pain adequately controlled with PRN medication and sleeping between care, no acute distress, dressing to L middle finger CDI, receiving antibiotics, neurovascular checks Q4 performed per order, and will continue to monitor. 24hr chart review performed.

## 2019-01-01 NOTE — OR NURSING
Patient transported to OR via wheelchair from MRI, report received, tolerated well. Transferred from wheelchair to stretcher before entering surgery.   Verified Results  VITAMIN D,25 HYDROXY [id number] 12:01AM HEDYBRIDGET HERMAN   [[id number] 12:01AM HEDYBRIDGET HERMAN   [

## 2019-01-01 NOTE — HOSPITAL COURSE
47 y/O wm admitted with a dx of Abscess of left middle finger and  flexor tenosynovitis . He ws started on iv Levaquin and IV vanc . He has a bedside I&D per  Ortho .  The MRI did not show No evidence of osteomyelitis.  No evidence of abscess.  No evidence of tenosynovitis. Pt was taken to the OR by  Ortho   And performed a INCISION AND DRAINAGE (I&D) (Right) index finger abscess, incision and drainage flexor tenosynovitis, excisional debridement skin, subcutaneous tissue, muscle, fascia. The Post Op dx Abscess of left middle finger and  flexor tenosynovitis. The ESBL 5 ml . The wound Cx is positive for staph . MRI negative for OM and tenosynovitis.    As of 1/3/18 Orthopedic surgery recommended aggressive betadine soaks TID. Occupational therapy has been consulted. Wound culture growing MRSA. Plan at discharge is complete 3 weeks minocycline. 1/4/19 No acute issues overnight. Ortho evaluated the patient this morning and felt the patient could discharge from their perspective. The patient was seen and examined today and deemed stable for discharge. The patient will discharge home on minocycline for 3 weeks. The patient will follow up with Joey ZHU with Ortho on 1/7/19.

## 2019-01-01 NOTE — TRANSFER OF CARE
"Anesthesia Transfer of Care Note    Patient: Aniceto Johansen    Procedure(s) Performed: Procedure(s) (LRB):  INCISION AND DRAINAGE, UPPER EXTREMITY (Left)    Patient location: PACU    Anesthesia Type: general    Transport from OR: Transported from OR on room air with adequate spontaneous ventilation    Post pain: adequate analgesia    Post assessment: no apparent anesthetic complications and tolerated procedure well    Post vital signs: stable    Level of consciousness: awake    Nausea/Vomiting: no nausea/vomiting    Complications: none    Transfer of care protocol was followed      Last vitals:   Visit Vitals  /65 (BP Location: Left arm, Patient Position: Lying)   Pulse 65   Temp 37 °C (98.6 °F) (Oral)   Resp 14   Ht 5' 6" (1.676 m)   Wt 54 kg (119 lb 0.8 oz)   SpO2 97%   BMI 19.21 kg/m²     "

## 2019-01-01 NOTE — PROGRESS NOTES
Patient continues to have HA with dilaudid IV.  Ordered Reglan IV given now.  DC dilaudid IV.  Ordered norco 7.5mg q6h prn moderate/severe pain and tramadol 50mg PO q6h prn breakthrough pain and/or HA.   Updated RN on changes.

## 2019-01-01 NOTE — ASSESSMENT & PLAN NOTE
Dx Abscess of left middle finger and flexor tenosynovitis  -CT showed concern for early tenosynovitis and abscess. S/P I&D x 2 per Ortho  -MRI  did not show No evidence of osteomyelitis.  No evidence of abscess.  No evidence of tenosynovitis.  -Continue Vancomycin. Start Levaquin  -Orthopedic Surgery following  -pain management  -Blood cx NGTD  -Wound Cx NGTD

## 2019-01-01 NOTE — PROGRESS NOTES
[facility] Hospital Medicine  Progress Note    Patient Name: Aniceto Johansen  MRN: [id number]  Patient Class: IP- Inpatient   Admission Date: 12/31/2018  Length of Stay: 1 days  Attending Physician: Angel Arguelles, *  Primary Care Provider: Primary Doctor No        Subjective:     Principal Problem:Abscess of left middle finger    HPI:  Aniceto Johansen is a 48 year old male with history of tobacco abuse who presents to ED with left middle swelling and pain. Symptoms began one day ago and has progressively worsened. He denies injury or trauma to affected extremity. He smokes 1 ppd. Denies ETOH or recreational use.     In ED, CT Hand reflected middle finger soft tissue swelling involving anterior tendon with possible early abscess. He is about to undergo bedside I&D. Orthopedic surgery has been consulted.       Hospital Course:  47 y/o wm admitted with a dx of Abscess of left middle finger  and  flexor tenosynovitis . He ws started on iv Levaquin and IV vanc . He has a bedside I&D per  Ortho .  The MRI did not show No evidence of osteomyelitis.  No evidence of abscess.  No evidence of tenosynovitis. Pt was taken to the OR by  Ortho   And performed a INCISION AND DRAINAGE (I&D) (Right) index finger abscess, incision and drainage flexor tenosynovitis, excisional debridement skin, subcutaneous tissue, muscle, fascia. The Post Op dx Abscess of left middle finger, flexor tenosynovitis. The ESBL 5 ml .                 Interval History:     Review of Systems   Constitutional: Negative for chills, diaphoresis, fatigue and fever.   HENT: Negative for drooling, ear pain, rhinorrhea and sore throat.    Eyes: Negative.    Respiratory: Negative for cough, shortness of breath and wheezing.    Cardiovascular: Negative for palpitations and leg swelling.   Gastrointestinal: Negative for abdominal pain, constipation, diarrhea and nausea.   Endocrine: Negative.    Genitourinary: Negative for dysuria,  hematuria and urgency.   Musculoskeletal:        Covered     Skin: Negative for color change and wound.   Allergic/Immunologic: Negative.    Neurological: Negative for dizziness, syncope and speech difficulty.   Hematological: Negative.    Psychiatric/Behavioral: Negative.      Objective:     Vital Signs (Most Recent):  Temp: 98.9 °F (37.2 °C) (01/01/19 1702)  Pulse: 74 (01/01/19 1702)  Resp: 14 (01/01/19 1702)  BP: 123/69 (01/01/19 1702)  SpO2: (!) 94 % (01/01/19 1702) Vital Signs (24h Range):  Temp:  [97.6 °F (36.4 °C)-99.1 °F (37.3 °C)] 98.9 °F (37.2 °C)  Pulse:  [] 74  Resp:  [10-18] 14  SpO2:  [94 %-100 %] 94 %  BP: (109-159)/(62-91) 123/69     Weight: 54 kg (119 lb 0.8 oz)  Body mass index is 19.21 kg/m².    Intake/Output Summary (Last 24 hours) at 1/1/2019 1724  Last data filed at 1/1/2019 1716  Gross per 24 hour   Intake 1800 ml   Output 5 ml   Net 1795 ml      Physical Exam   Constitutional: He is oriented to person, place, and time. He appears well-developed and well-nourished. No distress.   HENT:   Head: Normocephalic and atraumatic.   Eyes: EOM are normal.   Neck: Normal range of motion. Neck supple.   Cardiovascular: Normal rate, regular rhythm and normal heart sounds.   Pulmonary/Chest: Effort normal and breath sounds normal. No respiratory distress.   Abdominal: Soft. Bowel sounds are normal. He exhibits no distension. There is no tenderness.   Musculoskeletal: Normal range of motion. He exhibits no edema.   covered     Neurological: He is alert and oriented to person, place, and time.   Skin: Skin is dry.   Nursing note and vitals reviewed.      Significant Labs: All pertinent labs within the past 24 hours have been reviewed.    Significant Imaging: I have reviewed all pertinent imaging results/findings within the past 24 hours.    Assessment/Plan:      * Abscess of left middle finger     Dx Abscess of left middle finger  and flexor tenosynovitis  -CT showed concern for early tenosynovitis and  abscess. S/P I&D x 2 per Ortho  -MRI  did not show No evidence of osteomyelitis.  No evidence of abscess.  No evidence of tenosynovitis.  -Continue Vancomycin. Start Levaquin  -Orthopedic Surgery following  -pain management  -Blood cx NGTD  -Wound Cx NGTD      Tobacco abuse    -tobacco abuse         VTE Risk Mitigation (From admission, onward)        Ordered     IP VTE LOW RISK PATIENT  Once      12/31/18 1450     Place JASIEL hose  Until discontinued      12/31/18 1450              Angel Arguelles MD  Department of Hospital Medicine   [facility]

## 2019-01-01 NOTE — PLAN OF CARE
Problem: Adult Inpatient Plan of Care  Goal: Plan of Care Review  Outcome: Ongoing (interventions implemented as appropriate)  Plan of care reviewed with patient; verbalized understanding. Fall precautions maintained, patient remains free from injury. Pain being managed appropriately. Medications being administered as ordered. Vital signs stable with no signs of distress noted. Bed low and locked with call light in reach. Will continue monitor.

## 2019-01-01 NOTE — CONSULTS
"[facility]  Orthopedics  Consult Note    Patient Name: Aniceto Johansen  MRN: [id number]  Admission Date: 12/31/2018  Hospital Length of Stay: 1 days  Attending Provider: Angel Arguelles, *  Primary Care Provider: Primary Doctor No    Patient information was obtained from patient and ER records.     Inpatient consult to Orthopedic Surgery  Consult performed by: Dany Conway MD  Consult ordered by: MARKO Etienne    Inpatient consult to Orthopedic Surgery  Consult performed by: Dany Conway MD  Consult ordered by: Rivka Huber NP        Subjective:     Principal Problem:Abscess of left middle finger    Chief Complaint:   Chief Complaint   Patient presents with    Hand Pain     Pt states,"I have something wrong with my left middle finger, it's swollen and hurting."        HPI: Aniceto Johansen is a 48 year old male with history of tobacco abuse who presented to ED yesterday with left middle finger swelling and pain. Symptoms began two days ago and have progressively worsened. He denies injury or trauma to affected extremity. He smokes 1 ppd. Denies ETOH or recreational use. Associated symptoms include fevers. Patient states he had episode similar to this 4-5 years ago after getting a chemically treated wood splinter into his same left middle finger. He states he had to have a surgery to clean out the infection and believes his tendon was infected at that time as well. In ED, CT Hand reflected middle finger soft tissue swelling involving anterior tendon with possible early abscess. He is underwent bedside I&D. Orthopedic surgery was  Consulted and patient was admitted to hospital. Patient began on IV Vancomycin and Levaquin. Patient denies SOB, palpitations, nausea, and vomiting. Had I&D in ED yesterday, states he has been relatively the same, no significant improvement or worsening.            History reviewed. No pertinent past medical history.    Past " "Surgical History:   Procedure Laterality Date    HERNIA REPAIR         Review of patient's allergies indicates:   Allergen Reactions    Pcn [penicillins] Hives    Sulfa (sulfonamide antibiotics) Hives       Current Facility-Administered Medications   Medication    acetaminophen tablet 650 mg    HYDROcodone-acetaminophen 7.5-325 mg per tablet 1 tablet    levoFLOXacin 500 mg/100 mL IVPB 500 mg    nicotine 21 mg/24 hr 1 patch    ondansetron injection 4 mg    traMADol tablet 50 mg    vancomycin 750 mg in dextrose 5 % 250 mL IVPB (ready to mix system)     Family History     None        Tobacco Use    Smoking status: Current Every Day Smoker     Packs/day: 1.00     Types: Cigarettes    Smokeless tobacco: Never Used   Substance and Sexual Activity    Alcohol use: No     Frequency: Never    Drug use: No    Sexual activity: Not on file     Review of Systems   Constitution: Positive for fever. Negative for chills and weakness.   HENT: Negative for congestion and sore throat.    Cardiovascular: Negative for chest pain and palpitations.   Respiratory: Negative for cough and shortness of breath.    Skin: Positive for color change.   Musculoskeletal: Positive for joint pain and joint swelling. Negative for myalgias.   Gastrointestinal: Negative for abdominal pain, nausea and vomiting.   Genitourinary: Negative for dysuria, frequency and urgency.   Neurological: Negative for dizziness, numbness and paresthesias.     Objective:     Vital Signs (Most Recent):  Temp: 98.6 °F (37 °C) (01/01/19 0720)  Pulse: 65 (01/01/19 0720)  Resp: 14 (01/01/19 0720)  BP: 109/65 (01/01/19 0720)  SpO2: 97 % (01/01/19 0720) Vital Signs (24h Range):  Temp:  [98.4 °F (36.9 °C)-99.1 °F (37.3 °C)] 98.6 °F (37 °C)  Pulse:  [65-81] 65  Resp:  [14-18] 14  SpO2:  [96 %-98 %] 97 %  BP: (109-128)/(62-83) 109/65     Weight: 54 kg (119 lb 0.8 oz)  Height: 5' 6" (167.6 cm)  Body mass index is 19.21 kg/m².      Intake/Output Summary (Last 24 hours) at " 1/1/2019 1056  Last data filed at 1/1/2019 0243  Gross per 24 hour   Intake 1030 ml   Output --   Net 1030 ml       General    Constitutional: He is oriented to person, place, and time. He appears well-developed and well-nourished.   HENT:   Head: Normocephalic and atraumatic.   Right Ear: External ear normal.   Left Ear: External ear normal.   Nose: Nose normal.   Eyes: EOM are normal. Pupils are equal, round, and reactive to light. Right eye exhibits no discharge. Left eye exhibits no discharge.   Neck: Normal range of motion.   Cardiovascular: Intact distal pulses.    Pulmonary/Chest: Effort normal. No respiratory distress.   Abdominal: Soft.   Neurological: He is alert and oriented to person, place, and time.   Psychiatric: He has a normal mood and affect. His behavior is normal. Judgment and thought content normal.         Left Hand/Wrist Exam     Pain   Hand - The patient exhibits pain of the middle IP and middle MCP.    Swelling   Hand - The patient is swollen on the middle IP and middle MCP.    Tenderness   The patient is tender to palpation of the dorsal area and mcknight area.     Other     Sensory Exam  Median Distribution: normal  Ulnar Distribution: normal  Radial Distribution: normal    Comments:  Alert, oriented x 3   Left Upper Extremity   Painful to palpation along flexor tendon sheath  Mild tenderness over extensor side of finger  2cm volar incision over Middle IP joint with serosanguinous drainage with milking of the wound   + swelling to Left Middle Finger  Compartment soft  2+ distal pulses  Sensation intact  Able to flex/extend digit          Vascular Exam       Capillary Refill  Left Hand: normal capillary refill      Significant Labs:   CBC:   Recent Labs   Lab 12/31/18  1107   WBC 9.87   HGB 13.6*   HCT 40.6        CMP:   Recent Labs   Lab 12/31/18  1107      K 4.3      CO2 22*   GLU 93   BUN 10   CREATININE 0.8   CALCIUM 9.3   ANIONGAP 11   EGFRNONAA >60       All pertinent  labs within the past 24 hours have been reviewed.    Significant Imaging: CT: I have reviewed all pertinent results/findings and my personal findings are:  No acute bone findings.  Nothing obvious for osteomyelitis.  Two.  Focal area of soft tissue swelling along the ventral aspect of the long finger, with possibilities as above.     EXAMINATION:  MRI HAND FINGERS W WO CONTRAST LEFT    CLINICAL HISTORY:  Hand erythema, swelling, cellulitis suspected;  Erythematous condition, unspecified    TECHNIQUE:  Multiplanar, multisequence MRI of the left hand was performed with and without IV contrast.    COMPARISON:  CT scan 12/31/2018.    FINDINGS:  Bone and bone marrow signal appears normal no evidence of osteomyelitis.  There is evidence of soft tissue swelling and enhancement involving the left 3rd finger suggesting cellulitis.  No evidence of abscess.  The flexor tendon appears intact without evidence of abnormal enhancement or enlargement.    A couple tiny foci of susceptibility artifact in the volar soft tissues this could be related susceptibility artifact from previous surgery.  Small foci of gas felt to be less likely as CT was unremarkable in this area.      Impression       No evidence of osteomyelitis.  No evidence of abscess.  No evidence of tenosynovitis.    Cellulitis suspected at the left 3rd finger.  Couple of small foci of susceptibility artifact in the volar soft tissues adjacent to the middle phalanx probably is related to prior surgery less likely air related to soft tissue infection.  Air was not present on CT scan performed yesterday.      Electronically signed by: Alireza Botello MD  Date: 01/01/2019  Time: 14:58         Assessment/Plan:     Active Diagnoses:    Diagnosis Date Noted POA    PRINCIPAL PROBLEM:  Abscess of left middle finger [L02.512] 12/31/2018 Yes    Tobacco abuse [Z72.0] 12/31/2018 Yes      Problems Resolved During this Admission:     We reviewed with Aniceto hammonds, the  pathology and natural history of his diagnosis. We have discussed a variety of treatment options including medications, physical therapy and other alternative treatments. I also explained the indications, risks and benefits of surgery. After discussion, Aniceto decided to proceed with surgery. The decision was made to go forward with    1. Incision and drainage left hand/middle finger    The details of the surgical procedure were explained, including the location of probable incisions and a description of likely hardware and/or grafts to be used.  The patient understands the likely convalescence after surgery.  Also, we have thoroughly discussed the risks, benefits and alternatives to surgery, including, but not limited to, the risk of infection, joint stiffness, blood clot (including DVT and/or pulmonary embolus), neurologic and vascular injury.  It was explained that, if tissue has been repaired or reconstructed, there is a chance of failure, which may require further management.      All of the patient's questions were answered and informed consent was obtained. The patient will contact us if they have any questions or concerns in the interim.      Thank you for your consult.    Dany Graham PA-C  Orthopedics  [facility]

## 2019-01-01 NOTE — ANESTHESIA PREPROCEDURE EVALUATION
01/01/2019  Aniceto Johansen is a 48 y.o., male.    Anesthesia Evaluation    I have reviewed the Patient Summary Reports.    I have reviewed the Nursing Notes.   I have reviewed the Medications.     Review of Systems  Anesthesia Hx:  No problems with previous Anesthesia  Denies Family Hx of Anesthesia complications.   Denies Personal Hx of Anesthesia complications.   Social:  Smoker, No Alcohol Use 1ppd x30+yrs   Hematology/Oncology:     Oncology Normal    -- Anemia:   Cardiovascular:   Denies Hypertension.  Denies MI.   Denies CABG/stent.         Pulmonary:   Denies COPD.  Denies Asthma.  Denies Sleep Apnea.    Renal/:  Renal/ Normal     Hepatic/GI:   Denies GERD. Denies Liver Disease.  Denies Hepatitis.    Musculoskeletal:   Abscess of left middle finger   Neurological:   Denies CVA. Denies Seizures.    Endocrine:  Endocrine Normal        Physical Exam  General:  Well nourished    Airway/Jaw/Neck:  Airway Findings: Mouth Opening: Normal Tongue: Normal  General Airway Assessment: Adult  Mallampati: II      Dental:  Dental Findings: In tact   Chest/Lungs:  Chest/Lungs Findings: Clear to auscultation, Normal Respiratory Rate     Heart/Vascular:  Heart Findings: Rate: Normal  Rhythm: Regular Rhythm  Sounds: Normal             Anesthesia Plan  Type of Anesthesia, risks & benefits discussed:  Anesthesia Type:  MAC, general  Patient's Preference:   Intra-op Monitoring Plan: standard ASA monitors  Intra-op Monitoring Plan Comments:   Post Op Pain Control Plan: multimodal analgesia  Post Op Pain Control Plan Comments:   Induction:   IV  Beta Blocker:  Patient is not currently on a Beta-Blocker (No further documentation required).       Informed Consent: Patient understands risks and agrees with Anesthesia plan.  Questions answered. Anesthesia consent signed with patient.  ASA Score: 2  emergent   Day of  Surgery Review of History & Physical: I have interviewed and examined the patient. I have reviewed the patient's H&P dated:  There are no significant changes.  H&P update referred to the surgeon.     Anesthesia Plan Notes: Questionable npo status per pa. Pt c/o nausea. Denies vomiting        Ready For Surgery From Anesthesia Perspective.

## 2019-01-01 NOTE — PROGRESS NOTES
Vancomycin Consult Note    Pharmacy consulted to dose vancomycin by JOVITA Huber  47 y/o male with no significant PMH except smoking    Indication: abscess of L middle finger s/p bedside I&D  WBC & temp are WNL    IBW = 63.8 kg  ABW = 54 kg (less than ideal) --> use for dosing  SCr = 0.8    Pt received 1gm dose in ER & will be continued on a maintenance dose of 750 mg (15 mg/kg) every 12 hours  Trough due tomorrow 01/01 @ 2200 before 4th total dose  Goal trough: 10-15 mcg/ml    Thank you for allowing us to participate in this patient's care.   Katherine McArdle, Pharm.D. 12/31/2018 8:59 PM

## 2019-01-01 NOTE — OP NOTE
Operative Note        Surgery Date: 01/01/2019      Surgeon(s) and Role:     * Dany Conway MD - Primary    ASSISTANT: Joey ZHU    The use of Joey ZHU  was medically necessary for patient positioning, skin retraction, closure and assistance with this procedure. The procedure could not be performed properly without the use of her as an assistant. There was no qualified resident/fellow available for assistance with this procedure.      Pre-op Diagnosis:  Abscess of left middle finger, flexor tenosynovitis     Post-op Diagnosis: Same     Procedure(s) (LRB):  INCISION AND DRAINAGE (I&D) left middle finger abscess, incision and drainage flexor tenosynovitis, excisional debridement skin, subcutaneous tissue, muscle, fascia     Anesthesia: General     Estimated Blood Loss: 5ml           Specimens: cultures        OPERATIVE INDICATIONS: Aniceto Johansen is a 48 y.o. male who had incision and drainage of finger abscess by the ED of a finger asbcess without much improvement. He was beginning to have pain over the flexor tendon sheath and there was concern for flexor tenosynovitis. Risks benefits and alternative were explained to the patient and informed consent obtained.        PROCEDURE IN DETAIL: Patient was met in the preoperative holding area where consents were signed and site was marked. At this time, all questions were answered. The family was in agreement. He was then taken back to the Operating Room where she was transferred to the hospital bed. All neurovascular structures were well padded.      A  tourniquet was placed on the operative upper extremity and the extremity was prepped and draped in a sterile fashion. A Timeout was performed at this time. All parties agreed.      Prior incision from ED was opened with blunt dissection. extend this incision slightly proximal in line with a brunner incision as it was in line with the prior wound. Small amount of purulence was  encountered. Cultures were taken. Neurovascular structures protected. excisional debridement skin, subcutaneous tissue, muscle, fascia using scalpel.  Blunt dissection was performed to decompress across the volar aspect of the proximal phalanx. Abscess was irrigated with 1L normal saline.       Attention was then turned to the flexor tendon sheath. Incision was made proximally over the A1 pulley and just distal to the DIP crease . Blunt dissection down to flexor tendon sheath protection neurovascular structures. Sheath was opened in line with the tendon. A 16 gauge angiocatheter was placed into the sheath and the sheath was irrigated with normal saline with fluid exiting through the distal opening.      All wounds were copiously irrigated with 3L NS.  Wounds left open to heal by secondary intention and allow drainage. Sterile dressing was placed     The case ended with no complications.  transferred from the surgery bed to the [facility] and was taken back to the PACU.  recovered from anesthesia.     The postoperative plan will be  IV antibiotics, and follow up cultures. Daily wound care with betadine soaks. Possible return to OR if no clinical improvement.      Dany Conway MD

## 2019-01-01 NOTE — SUBJECTIVE & OBJECTIVE
Interval History:     Review of Systems   Constitutional: Negative for chills, diaphoresis, fatigue and fever.   HENT: Negative for drooling, ear pain, rhinorrhea and sore throat.    Eyes: Negative.    Respiratory: Negative for cough, shortness of breath and wheezing.    Cardiovascular: Negative for palpitations and leg swelling.   Gastrointestinal: Negative for abdominal pain, constipation, diarrhea and nausea.   Endocrine: Negative.    Genitourinary: Negative for dysuria, hematuria and urgency.   Musculoskeletal:        Covered     Skin: Negative for color change and wound.   Allergic/Immunologic: Negative.    Neurological: Negative for dizziness, syncope and speech difficulty.   Hematological: Negative.    Psychiatric/Behavioral: Negative.      Objective:     Vital Signs (Most Recent):  Temp: 98.9 °F (37.2 °C) (01/01/19 1702)  Pulse: 74 (01/01/19 1702)  Resp: 14 (01/01/19 1702)  BP: 123/69 (01/01/19 1702)  SpO2: (!) 94 % (01/01/19 1702) Vital Signs (24h Range):  Temp:  [97.6 °F (36.4 °C)-99.1 °F (37.3 °C)] 98.9 °F (37.2 °C)  Pulse:  [] 74  Resp:  [10-18] 14  SpO2:  [94 %-100 %] 94 %  BP: (109-159)/(62-91) 123/69     Weight: 54 kg (119 lb 0.8 oz)  Body mass index is 19.21 kg/m².    Intake/Output Summary (Last 24 hours) at 1/1/2019 1724  Last data filed at 1/1/2019 1716  Gross per 24 hour   Intake 1800 ml   Output 5 ml   Net 1795 ml      Physical Exam   Constitutional: He is oriented to person, place, and time. He appears well-developed and well-nourished. No distress.   HENT:   Head: Normocephalic and atraumatic.   Eyes: EOM are normal.   Neck: Normal range of motion. Neck supple.   Cardiovascular: Normal rate, regular rhythm and normal heart sounds.   Pulmonary/Chest: Effort normal and breath sounds normal. No respiratory distress.   Abdominal: Soft. Bowel sounds are normal. He exhibits no distension. There is no tenderness.   Musculoskeletal: Normal range of motion. He exhibits no edema.   covered      Neurological: He is alert and oriented to person, place, and time.   Skin: Skin is dry.   Nursing note and vitals reviewed.      Significant Labs: All pertinent labs within the past 24 hours have been reviewed.    Significant Imaging: I have reviewed all pertinent imaging results/findings within the past 24 hours.

## 2019-01-02 LAB
ANION GAP SERPL CALC-SCNC: 11 MMOL/L
BASOPHILS # BLD AUTO: 0.01 K/UL
BASOPHILS NFR BLD: 0.1 %
BUN SERPL-MCNC: 12 MG/DL
CALCIUM SERPL-MCNC: 9.7 MG/DL
CHLORIDE SERPL-SCNC: 101 MMOL/L
CO2 SERPL-SCNC: 26 MMOL/L
CREAT SERPL-MCNC: 0.9 MG/DL
CRP SERPL-MCNC: 9.7 MG/L
DIFFERENTIAL METHOD: ABNORMAL
EOSINOPHIL # BLD AUTO: 0 K/UL
EOSINOPHIL NFR BLD: 0.1 %
ERYTHROCYTE [DISTWIDTH] IN BLOOD BY AUTOMATED COUNT: 13.2 %
ERYTHROCYTE [SEDIMENTATION RATE] IN BLOOD BY WESTERGREN METHOD: 25 MM/HR
EST. GFR  (AFRICAN AMERICAN): >60 ML/MIN/1.73 M^2
EST. GFR  (NON AFRICAN AMERICAN): >60 ML/MIN/1.73 M^2
ESTIMATED AVG GLUCOSE: 100 MG/DL
GLUCOSE SERPL-MCNC: 115 MG/DL
HBA1C MFR BLD HPLC: 5.1 %
HCT VFR BLD AUTO: 40.8 %
HGB BLD-MCNC: 14.1 G/DL
LYMPHOCYTES # BLD AUTO: 1.8 K/UL
LYMPHOCYTES NFR BLD: 13.6 %
MCH RBC QN AUTO: 30.5 PG
MCHC RBC AUTO-ENTMCNC: 34.6 G/DL
MCV RBC AUTO: 88 FL
MONOCYTES # BLD AUTO: 0.3 K/UL
MONOCYTES NFR BLD: 2.3 %
NEUTROPHILS # BLD AUTO: 11 K/UL
NEUTROPHILS NFR BLD: 84.1 %
PLATELET # BLD AUTO: 247 K/UL
PMV BLD AUTO: 8.7 FL
POTASSIUM SERPL-SCNC: 4.2 MMOL/L
RBC # BLD AUTO: 4.63 M/UL
SODIUM SERPL-SCNC: 138 MMOL/L
VANCOMYCIN TROUGH SERPL-MCNC: 8.3 UG/ML
WBC # BLD AUTO: 13.15 K/UL

## 2019-01-02 PROCEDURE — 63600175 PHARM REV CODE 636 W HCPCS: Performed by: INTERNAL MEDICINE

## 2019-01-02 PROCEDURE — 85025 COMPLETE CBC W/AUTO DIFF WBC: CPT

## 2019-01-02 PROCEDURE — 63600175 PHARM REV CODE 636 W HCPCS: Performed by: NURSE PRACTITIONER

## 2019-01-02 PROCEDURE — 85651 RBC SED RATE NONAUTOMATED: CPT

## 2019-01-02 PROCEDURE — 25000003 PHARM REV CODE 250: Performed by: INTERNAL MEDICINE

## 2019-01-02 PROCEDURE — 25000003 PHARM REV CODE 250: Performed by: NURSE PRACTITIONER

## 2019-01-02 PROCEDURE — 87040 BLOOD CULTURE FOR BACTERIA: CPT

## 2019-01-02 PROCEDURE — 86140 C-REACTIVE PROTEIN: CPT

## 2019-01-02 PROCEDURE — 80048 BASIC METABOLIC PNL TOTAL CA: CPT

## 2019-01-02 PROCEDURE — 36415 COLL VENOUS BLD VENIPUNCTURE: CPT

## 2019-01-02 PROCEDURE — 11000001 HC ACUTE MED/SURG PRIVATE ROOM

## 2019-01-02 PROCEDURE — 80202 ASSAY OF VANCOMYCIN: CPT

## 2019-01-02 PROCEDURE — 86703 HIV-1/HIV-2 1 RESULT ANTBDY: CPT

## 2019-01-02 RX ORDER — ONDANSETRON 2 MG/ML
4 INJECTION INTRAMUSCULAR; INTRAVENOUS ONCE
Status: COMPLETED | OUTPATIENT
Start: 2019-01-02 | End: 2019-01-02

## 2019-01-02 RX ORDER — IBUPROFEN 400 MG/1
400 TABLET ORAL EVERY 6 HOURS PRN
Status: DISCONTINUED | OUTPATIENT
Start: 2019-01-02 | End: 2019-01-02

## 2019-01-02 RX ORDER — PROMETHAZINE HYDROCHLORIDE 25 MG/ML
6.25 INJECTION, SOLUTION INTRAMUSCULAR; INTRAVENOUS EVERY 6 HOURS PRN
Status: DISCONTINUED | OUTPATIENT
Start: 2019-01-02 | End: 2019-01-04 | Stop reason: HOSPADM

## 2019-01-02 RX ORDER — VANCOMYCIN HCL IN 5 % DEXTROSE 1G/250ML
1000 PLASTIC BAG, INJECTION (ML) INTRAVENOUS
Status: DISCONTINUED | OUTPATIENT
Start: 2019-01-02 | End: 2019-01-04

## 2019-01-02 RX ORDER — ONDANSETRON 2 MG/ML
4 INJECTION INTRAMUSCULAR; INTRAVENOUS EVERY 6 HOURS PRN
Status: DISCONTINUED | OUTPATIENT
Start: 2019-01-02 | End: 2019-01-04 | Stop reason: HOSPADM

## 2019-01-02 RX ADMIN — OXYCODONE HYDROCHLORIDE AND ACETAMINOPHEN 1 TABLET: 7.5; 325 TABLET ORAL at 05:01

## 2019-01-02 RX ADMIN — ONDANSETRON 4 MG: 2 INJECTION INTRAMUSCULAR; INTRAVENOUS at 10:01

## 2019-01-02 RX ADMIN — VANCOMYCIN HYDROCHLORIDE 750 MG: 750 INJECTION, POWDER, LYOPHILIZED, FOR SOLUTION INTRAVENOUS at 10:01

## 2019-01-02 RX ADMIN — VANCOMYCIN HYDROCHLORIDE 1000 MG: 1 INJECTION, POWDER, LYOPHILIZED, FOR SOLUTION INTRAVENOUS at 11:01

## 2019-01-02 RX ADMIN — PROMETHAZINE HYDROCHLORIDE 6.25 MG: 25 INJECTION INTRAMUSCULAR; INTRAVENOUS at 02:01

## 2019-01-02 RX ADMIN — LEVOFLOXACIN 500 MG: 500 INJECTION, SOLUTION INTRAVENOUS at 02:01

## 2019-01-02 RX ADMIN — ONDANSETRON 4 MG: 2 INJECTION, SOLUTION INTRAMUSCULAR; INTRAVENOUS at 12:01

## 2019-01-02 NOTE — SUBJECTIVE & OBJECTIVE
Interval History: Pt was seen and examined at bedside . He is  complaining of nausea and dizziness     Review of Systems   Constitutional: Negative for chills, diaphoresis, fatigue and fever.   HENT: Negative for drooling, ear pain, rhinorrhea and sore throat.    Eyes: Negative.    Respiratory: Negative for cough, shortness of breath and wheezing.    Cardiovascular: Negative for palpitations and leg swelling.   Gastrointestinal: Negative for abdominal pain, constipation, diarrhea and nausea.   Endocrine: Negative.    Genitourinary: Negative for dysuria, hematuria and urgency.   Musculoskeletal:        Covered     Skin: Negative for color change and wound.   Allergic/Immunologic: Negative.    Neurological: Negative for dizziness, syncope and speech difficulty.   Hematological: Negative.    Psychiatric/Behavioral: Negative.      Objective:     Vital Signs (Most Recent):  Temp: 98.4 °F (36.9 °C) (01/02/19 1229)  Pulse: 68 (01/02/19 1229)  Resp: 18 (01/02/19 1229)  BP: 127/69 (01/02/19 1229)  SpO2: 98 % (01/02/19 1229) Vital Signs (24h Range):  Temp:  [97.5 °F (36.4 °C)-98.9 °F (37.2 °C)] 98.4 °F (36.9 °C)  Pulse:  [] 68  Resp:  [10-19] 18  SpO2:  [94 %-100 %] 98 %  BP: ()/(50-91) 127/69     Weight: 54 kg (119 lb 0.8 oz)  Body mass index is 19.21 kg/m².    Intake/Output Summary (Last 24 hours) at 1/2/2019 1505  Last data filed at 1/2/2019 1454  Gross per 24 hour   Intake 1686.25 ml   Output 5 ml   Net 1681.25 ml      Physical Exam   Constitutional: He is oriented to person, place, and time. He appears well-developed and well-nourished. No distress.   HENT:   Head: Normocephalic and atraumatic.   Eyes: EOM are normal.   Neck: Normal range of motion. Neck supple.   Cardiovascular: Normal rate, regular rhythm and normal heart sounds.   Pulmonary/Chest: Effort normal and breath sounds normal. No respiratory distress.   Abdominal: Soft. Bowel sounds are normal. He exhibits no distension. There is no tenderness.    Musculoskeletal: Normal range of motion. He exhibits no edema.   covered     Neurological: He is alert and oriented to person, place, and time.   Skin: Skin is dry.   Nursing note and vitals reviewed.      Significant Labs: All pertinent labs within the past 24 hours have been reviewed.    Significant Imaging: I have reviewed all pertinent imaging results/findings within the past 24 hours.

## 2019-01-02 NOTE — PLAN OF CARE
Problem: Adult Inpatient Plan of Care  Goal: Plan of Care Review  Outcome: Ongoing (interventions implemented as appropriate)  Plan of care reviewed with patient; verbalized understanding. Fall precautions maintained, patient remains free from injury. Pain being managed appropriately. Medications being administered as ordered. Wound care and dressing changes done per order. Vital signs stable with no signs of distress noted. Bed low and locked with call light in reach. Will continue monitor.

## 2019-01-02 NOTE — PLAN OF CARE
Met with patient. Patient denies any post hospital needs or services at this time. Patient stated that he lives next door to his sister and his family is very supportive. Patient declined link to [facility].  Transitional Care Folder, Discharge Planning Begins on Admission pamphlet, [facility] Bedside Delivery pamphlet, Advance Directive information given to patient along with the contact information given.Instructed patient or family to call with any questions or concerns.      No Pharmacies Listed  Primary Doctor No  Payor: MEDICAID / Plan: [place] / Product Type: Managed Medicaid /           01/02/19 1320   Discharge Assessment   Assessment Type Discharge Planning Assessment   Confirmed/corrected address and phone number on facesheet? Yes   Assessment information obtained from? Patient;Medical Record   Expected Length of Stay (days) (tbd)   Communicated expected length of stay with patient/caregiver yes   Prior to hospitilization cognitive status: Alert/Oriented   Prior to hospitalization functional status: Independent   Current cognitive status: Alert/Oriented   Current Functional Status: Independent   Facility Arrived From: home   Lives With alone   Able to Return to Prior Arrangements yes   Is patient able to care for self after discharge? Yes   Who are your caregiver(s) and their phone number(s)? Chidi Johansen ( father ) [phone]   Patient's perception of discharge disposition home or selfcare   Readmission Within the Last 30 Days no previous admission in last 30 days   Patient currently being followed by outpatient case management? No   Patient currently receives any other outside agency services? No   Equipment Currently Used at Home none   Do you have any problems affording any of your prescribed medications? No   Is the patient taking medications as prescribed? yes   Does the patient have transportation home? Yes   Does the patient receive services at the [facility]? No    Discharge Plan A Home;Home with family   Discharge Plan B Home   Patient/Family in Agreement with Plan yes

## 2019-01-02 NOTE — PROGRESS NOTES
Vancomycin Progress Notes:  Indication: cellulitis of left middle finger    Estimated Creatinine Clearance: 78.3 mL/min (based on SCr of 0.9 mg/dL).  white blood cell count = 13.15  Tmax/24h  = 98.9 F  Cultures -Blood has been sent   Collected: 01/01/19 1541   Order Status: Completed Specimen: Abscess from Finger, Left Hand Updated: 01/02/19 0719    Aerobic Bacterial Culture Insufficient incubation, culture in progress   Narrative:         Serum creatinine stable at 0.9  Last trough was 7.3 @ 2200 on 1/1/19  Changing  dose to 1 gm iv q12hrs   Next trough due 1/4 @ 0926  Erika Diop [facility] 1/2/2019 12:08 PM

## 2019-01-02 NOTE — PROGRESS NOTES
Orthopedic Sports Surgery Rounding Note    2019    Patient seen and examined. Doing well without new complains. Pain is well controlled. Patient has been complaining of severe nausea which is being treated with Zofran. Dressing is C/D/I.     Vital Signs:    Vitals:    19 1702 19 2038 19 2353 19 0454   BP: 123/69 111/63 (!) 99/50 128/67   BP Location:  Right arm Right arm Right arm   Patient Position: Lying Lying Lying Lying   Pulse: 74 61 71 67   Resp: 14    Temp: 98.9 °F (37.2 °C) 97.9 °F (36.6 °C) 97.5 °F (36.4 °C) 97.8 °F (36.6 °C)   TempSrc: Oral Oral Oral Oral   SpO2: (!) 94% 97% 95% 95%   Weight:       Height:           Temp (48hrs), Av.3 °F (36.8 °C), Min:97.5 °F (36.4 °C), Max:99.1 °F (37.3 °C)      Recent Lab Results:  Recent Labs   Lab 18  1107 19  0515    138   K 4.3 4.2    101   CO2 22* 26   BUN 10 12   CREATININE 0.8 0.9   GLU 93 115*   CALCIUM 9.3 9.7     Recent Labs     18  1107 19  0515   WBC 9.87 13.15*   HGB 13.6* 14.1   HCT 40.6 40.8    247   INR 0.9  --          Physical Exam:  A/O *3. No acute distress    Left Upper Extremity  Dressing/Incision C/D/I  Sensation Present  Cap refill less than 2 sec  Compartment soft, NT  Finger Flexion/Extension intact      Assessment:  Abscess of left middle finger and flexor tenosynovitis s/p I&D, POD # 1    Plan:  Pain Controlled  PT/OT  DVT prophylaxis   Left UE NWB  Antibiotics per Hospital Team  Warm Water-Betadine Soaks with Bandage Changes TID  Follow up cultures    Dany Graham PAMALENA  Orthopedic Surgery Sports Medicine

## 2019-01-02 NOTE — PLAN OF CARE
Problem: Adult Inpatient Plan of Care  Goal: Plan of Care Review  Outcome: Ongoing (interventions implemented as appropriate)  POC reviewed, including indications and possible side effects of administered medications. Patient verbalized understanding and teach back. No adverse reactions noted. Patient c/o nausea and finger/hand pain. Administered medications per order. Neurovascular checks performed. Dressing remains CDI. Patient remains free of falls and injuries during shift. Will continue to monitor.    Chart check complete.

## 2019-01-02 NOTE — PROGRESS NOTES
[facility] Hospital Medicine  Progress Note    Patient Name: Aniceto Johansen  MRN: [id number]  Patient Class: IP- Inpatient   Admission Date: 12/31/2018  Length of Stay: 2 days  Attending Physician: Angel Arguelles, *  Primary Care Provider: Primary Doctor No        Subjective:     Principal Problem:Abscess of left middle finger    HPI:  Aniceto Johansen is a 48 year old male with history of tobacco abuse who presents to ED with left middle swelling and pain. Symptoms began one day ago and has progressively worsened. He denies injury or trauma to affected extremity. He smokes 1 ppd. Denies ETOH or recreational use.     In ED, CT Hand reflected middle finger soft tissue swelling involving anterior tendon with possible early abscess. He is about to undergo bedside I&D. Orthopedic surgery has been consulted.       Hospital Course:  49 y/O wm admitted with a dx of Abscess of left middle finger  and  flexor tenosynovitis . He ws started on iv Levaquin and IV vanc . He has a bedside I&D per  Ortho .  The MRI did not show No evidence of osteomyelitis.  No evidence of abscess.  No evidence of tenosynovitis. Pt was taken to the OR by  Ortho   And performed a INCISION AND DRAINAGE (I&D) (Right) index finger abscess, incision and drainage flexor tenosynovitis, excisional debridement skin, subcutaneous tissue, muscle, fascia. The Post Op dx Abscess of left middle finger  and  flexor tenosynovitis. The ESBL 5 ml . The wound Cx is positive for staph .                Interval History: Pt was seen and examined at bedside . He is  complaining of nausea and dizziness     Review of Systems   Constitutional: Negative for chills, diaphoresis, fatigue and fever.   HENT: Negative for drooling, ear pain, rhinorrhea and sore throat.    Eyes: Negative.    Respiratory: Negative for cough, shortness of breath and wheezing.    Cardiovascular: Negative for palpitations and leg swelling.   Gastrointestinal: Negative for  abdominal pain, constipation, diarrhea and nausea.   Endocrine: Negative.    Genitourinary: Negative for dysuria, hematuria and urgency.   Musculoskeletal:        Covered     Skin: Negative for color change and wound.   Allergic/Immunologic: Negative.    Neurological: Negative for dizziness, syncope and speech difficulty.   Hematological: Negative.    Psychiatric/Behavioral: Negative.      Objective:     Vital Signs (Most Recent):  Temp: 98.4 °F (36.9 °C) (01/02/19 1229)  Pulse: 68 (01/02/19 1229)  Resp: 18 (01/02/19 1229)  BP: 127/69 (01/02/19 1229)  SpO2: 98 % (01/02/19 1229) Vital Signs (24h Range):  Temp:  [97.5 °F (36.4 °C)-98.9 °F (37.2 °C)] 98.4 °F (36.9 °C)  Pulse:  [] 68  Resp:  [10-19] 18  SpO2:  [94 %-100 %] 98 %  BP: ()/(50-91) 127/69     Weight: 54 kg (119 lb 0.8 oz)  Body mass index is 19.21 kg/m².    Intake/Output Summary (Last 24 hours) at 1/2/2019 1505  Last data filed at 1/2/2019 1454  Gross per 24 hour   Intake 1686.25 ml   Output 5 ml   Net 1681.25 ml      Physical Exam   Constitutional: He is oriented to person, place, and time. He appears well-developed and well-nourished. No distress.   HENT:   Head: Normocephalic and atraumatic.   Eyes: EOM are normal.   Neck: Normal range of motion. Neck supple.   Cardiovascular: Normal rate, regular rhythm and normal heart sounds.   Pulmonary/Chest: Effort normal and breath sounds normal. No respiratory distress.   Abdominal: Soft. Bowel sounds are normal. He exhibits no distension. There is no tenderness.   Musculoskeletal: Normal range of motion. He exhibits no edema.   covered     Neurological: He is alert and oriented to person, place, and time.   Skin: Skin is dry.   Nursing note and vitals reviewed.      Significant Labs: All pertinent labs within the past 24 hours have been reviewed.    Significant Imaging: I have reviewed all pertinent imaging results/findings within the past 24 hours.    Assessment/Plan:      * Abscess of left middle  finger     Dx Abscess of left middle finger and flexor tenosynovitis  -CT showed concern for early tenosynovitis and abscess. S/P I&D x 2 per Ortho  -MRI  did not show No evidence of osteomyelitis.  No evidence of abscess.  No evidence of tenosynovitis.  -Continue Vancomycin. D/C levaquin   -Orthopedic Surgery following  -pain management   -S/P I&D of right index finger abscess, incision and drainage flexor tenosynovitis  -Blood cx  Pending   -Wound Cx  Staph      Tobacco abuse    -tobacco abuse         VTE Risk Mitigation (From admission, onward)        Ordered     IP VTE LOW RISK PATIENT  Once      12/31/18 1450     Place JASIEL hose  Until discontinued      12/31/18 1450              Angel Arguelles MD  Department of Hospital Medicine   [facility]

## 2019-01-02 NOTE — ASSESSMENT & PLAN NOTE
Dx Abscess of left middle finger and flexor tenosynovitis  -CT showed concern for early tenosynovitis and abscess. S/P I&D x 2 per Ortho  -MRI  did not show No evidence of osteomyelitis.  No evidence of abscess.  No evidence of tenosynovitis.  -Continue Vancomycin. D/C levaquin   -Orthopedic Surgery following  -pain management   -S/P I&D of right index finger abscess, incision and drainage flexor tenosynovitis  -Blood cx  Pending   -Wound Cx  Staph

## 2019-01-03 LAB
ANION GAP SERPL CALC-SCNC: 9 MMOL/L
BACTERIA SPEC AEROBE CULT: NORMAL
BACTERIA SPEC AEROBE CULT: NORMAL
BASOPHILS # BLD AUTO: 0.04 K/UL
BASOPHILS NFR BLD: 0.5 %
BILIRUB UR QL STRIP: NEGATIVE
BUN SERPL-MCNC: 18 MG/DL
CALCIUM SERPL-MCNC: 9.3 MG/DL
CHLORIDE SERPL-SCNC: 105 MMOL/L
CLARITY UR: CLEAR
CO2 SERPL-SCNC: 25 MMOL/L
COLOR UR: YELLOW
CREAT SERPL-MCNC: 0.9 MG/DL
DIFFERENTIAL METHOD: ABNORMAL
EOSINOPHIL # BLD AUTO: 0.3 K/UL
EOSINOPHIL NFR BLD: 3.2 %
ERYTHROCYTE [DISTWIDTH] IN BLOOD BY AUTOMATED COUNT: 12.8 %
EST. GFR  (AFRICAN AMERICAN): >60 ML/MIN/1.73 M^2
EST. GFR  (NON AFRICAN AMERICAN): >60 ML/MIN/1.73 M^2
GLUCOSE SERPL-MCNC: 101 MG/DL
GLUCOSE UR QL STRIP: NEGATIVE
HCT VFR BLD AUTO: 40.6 %
HGB BLD-MCNC: 13.5 G/DL
HGB UR QL STRIP: NEGATIVE
HIV 1+2 AB+HIV1 P24 AG SERPL QL IA: NEGATIVE
KETONES UR QL STRIP: NEGATIVE
LEUKOCYTE ESTERASE UR QL STRIP: NEGATIVE
LYMPHOCYTES # BLD AUTO: 1.9 K/UL
LYMPHOCYTES NFR BLD: 23.7 %
MCH RBC QN AUTO: 29.9 PG
MCHC RBC AUTO-ENTMCNC: 33.3 G/DL
MCV RBC AUTO: 90 FL
MONOCYTES # BLD AUTO: 0.9 K/UL
MONOCYTES NFR BLD: 11 %
NEUTROPHILS # BLD AUTO: 5 K/UL
NEUTROPHILS NFR BLD: 61.6 %
NITRITE UR QL STRIP: NEGATIVE
PH UR STRIP: 6 [PH] (ref 5–8)
PLATELET # BLD AUTO: 233 K/UL
PMV BLD AUTO: 8.7 FL
POTASSIUM SERPL-SCNC: 4.3 MMOL/L
PROT UR QL STRIP: NEGATIVE
RBC # BLD AUTO: 4.51 M/UL
SODIUM SERPL-SCNC: 139 MMOL/L
SP GR UR STRIP: 1.02 (ref 1–1.03)
URN SPEC COLLECT METH UR: NORMAL
UROBILINOGEN UR STRIP-ACNC: NEGATIVE EU/DL
WBC # BLD AUTO: 8.15 K/UL

## 2019-01-03 PROCEDURE — 63600175 PHARM REV CODE 636 W HCPCS: Performed by: INTERNAL MEDICINE

## 2019-01-03 PROCEDURE — G8989 SELF CARE D/C STATUS: HCPCS | Mod: CH

## 2019-01-03 PROCEDURE — 63600175 PHARM REV CODE 636 W HCPCS: Performed by: NURSE PRACTITIONER

## 2019-01-03 PROCEDURE — 97110 THERAPEUTIC EXERCISES: CPT

## 2019-01-03 PROCEDURE — 25000003 PHARM REV CODE 250: Performed by: NURSE PRACTITIONER

## 2019-01-03 PROCEDURE — 36415 COLL VENOUS BLD VENIPUNCTURE: CPT

## 2019-01-03 PROCEDURE — 97165 OT EVAL LOW COMPLEX 30 MIN: CPT

## 2019-01-03 PROCEDURE — 11000001 HC ACUTE MED/SURG PRIVATE ROOM

## 2019-01-03 PROCEDURE — G8987 SELF CARE CURRENT STATUS: HCPCS | Mod: CH

## 2019-01-03 PROCEDURE — 81003 URINALYSIS AUTO W/O SCOPE: CPT

## 2019-01-03 PROCEDURE — 85025 COMPLETE CBC W/AUTO DIFF WBC: CPT

## 2019-01-03 PROCEDURE — 80048 BASIC METABOLIC PNL TOTAL CA: CPT

## 2019-01-03 PROCEDURE — G8988 SELF CARE GOAL STATUS: HCPCS | Mod: CH

## 2019-01-03 PROCEDURE — 25000003 PHARM REV CODE 250: Performed by: INTERNAL MEDICINE

## 2019-01-03 RX ADMIN — PROMETHAZINE HYDROCHLORIDE 6.25 MG: 25 INJECTION INTRAMUSCULAR; INTRAVENOUS at 02:01

## 2019-01-03 RX ADMIN — VANCOMYCIN HYDROCHLORIDE 1000 MG: 1 INJECTION, POWDER, LYOPHILIZED, FOR SOLUTION INTRAVENOUS at 10:01

## 2019-01-03 RX ADMIN — VANCOMYCIN HYDROCHLORIDE 1000 MG: 1 INJECTION, POWDER, LYOPHILIZED, FOR SOLUTION INTRAVENOUS at 09:01

## 2019-01-03 RX ADMIN — OXYCODONE HYDROCHLORIDE AND ACETAMINOPHEN 1 TABLET: 7.5; 325 TABLET ORAL at 11:01

## 2019-01-03 NOTE — PROGRESS NOTES
Orthopedic Sports Surgery Rounding Note    1/3/2019    Patient seen and examined. Doing well without new complains. Pain is well controlled. Patient has been complaining of severe nausea which is being treated with Zofran. Dressing with minimal drainage. Patient reports Betadine soaks TID yesterday. Dressing taken off at bedside and nurse alerted to come and do soaks. Preliminary wound culture results growing S. Aureus.     Vital Signs:    Vitals:    19 1229 19 1628 19 2238 19 0408   BP: 127/69 (!) 113/59 122/68 (!) 108/56   BP Location: Right arm Right arm  Right arm   Patient Position: Lying Lying Lying Lying   Pulse: 68 73 65 (!) 57   Resp: 18 18 15 19   Temp: 98.4 °F (36.9 °C) 99 °F (37.2 °C) 98 °F (36.7 °C) 98.7 °F (37.1 °C)   TempSrc: Oral Oral Oral Oral   SpO2: 98% 95% 98% 97%   Weight:       Height:           Temp (48hrs), Av.2 °F (36.8 °C), Min:97.5 °F (36.4 °C), Max:99 °F (37.2 °C)      Recent Lab Results:  Recent Labs   Lab 18  1107 19  0515 19  0457    138 139   K 4.3 4.2 4.3    101 105   CO2 22* 26 25   BUN 10 12 18   CREATININE 0.8 0.9 0.9   GLU 93 115* 101   CALCIUM 9.3 9.7 9.3     Recent Labs     18  1107 19  0515 19  0457   WBC 9.87 13.15* 8.15   HGB 13.6* 14.1 13.5*   HCT 40.6 40.8 40.6    247 233   INR 0.9  --   --          Physical Exam:  A/O *3. No acute distress    Left Upper Extremity  Dressing/Incision C/D/I   Sensation Present  Cap refill less than 2 sec  Compartment soft, NT  Finger Flexion/Extension intact      Assessment:  Abscess of left middle finger and flexor tenosynovitis s/p I&D, POD # 2    Plan:  Pain Controlled  PT/OT- Hand/Finger ROM  DVT prophylaxis   Left UE NWB  Antibiotics per Hospital Team/ID  Warm Water-Betadine Soaks with Bandage Changes TID  Follow up cultures - preliminary results S. Aureus  Will need clinic f/up on discharge-f/up with Joey Dodd 1 wk after discharge  date    Joey Conway MD  Orthopedic Surgery Sports Medicine

## 2019-01-03 NOTE — ASSESSMENT & PLAN NOTE
Dx Abscess of left middle finger and flexor tenosynovitis  -CT showed concern for early tenosynovitis and abscess. S/P I&D x 2 per Ortho  -MRI  did not show No evidence of osteomyelitis.  No evidence of abscess.  No evidence of tenosynovitis.  -Continue Vancomycin. D/C levaquin   -Orthopedic Surgery following  -pain management   -S/P I&D of right index finger abscess, incision and drainage   -wound culture from 1/1/19 positive MRSA  -blood cultures are negative.   -continue betadine soaks TID.   -occupational therapy consulted

## 2019-01-03 NOTE — SUBJECTIVE & OBJECTIVE
Interval History:complains of left middle finger pain with flexion and extension. No other complaints.     Review of Systems   Constitutional: Negative for chills, diaphoresis, fatigue and fever.   HENT: Negative for drooling, ear pain, rhinorrhea and sore throat.    Eyes: Negative.    Respiratory: Negative for cough, shortness of breath and wheezing.    Cardiovascular: Negative for palpitations and leg swelling.   Gastrointestinal: Negative for abdominal pain, constipation, diarrhea and nausea.   Endocrine: Negative.    Genitourinary: Negative for dysuria, hematuria and urgency.   Musculoskeletal:        Covered     Skin: Negative for color change and wound.   Allergic/Immunologic: Negative.    Neurological: Negative for dizziness, syncope and speech difficulty.   Hematological: Negative.    Psychiatric/Behavioral: Negative.       Objective:     Vital Signs (Most Recent):  Temp: 98.3 °F (36.8 °C) (01/03/19 0739)  Pulse: 64 (01/03/19 0739)  Resp: 18 (01/03/19 0739)  BP: 96/62 (01/03/19 0739)  SpO2: 98 % (01/03/19 0739) Vital Signs (24h Range):  Temp:  [98 °F (36.7 °C)-99 °F (37.2 °C)] 98.3 °F (36.8 °C)  Pulse:  [57-73] 64  Resp:  [15-19] 18  SpO2:  [95 %-98 %] 98 %  BP: ()/(56-69) 96/62     Weight: 54 kg (119 lb 0.8 oz)  Body mass index is 19.21 kg/m².    Intake/Output Summary (Last 24 hours) at 1/3/2019 1158  Last data filed at 1/3/2019 0209  Gross per 24 hour   Intake 352.47 ml   Output --   Net 352.47 ml      Physical Exam   Constitutional: He is oriented to person, place, and time. He appears well-developed and well-nourished. No distress.   HENT:   Head: Normocephalic and atraumatic.   Eyes: EOM are normal.   Neck: Normal range of motion. Neck supple.   Cardiovascular: Normal rate, regular rhythm and normal heart sounds.   Pulmonary/Chest: Effort normal and breath sounds normal. No respiratory distress.   Abdominal: Soft. Bowel sounds are normal. He exhibits no distension. There is no tenderness.    Musculoskeletal: Normal range of motion. He exhibits no edema.   Left middle finger dressing. < flexion ability.     Neurological: He is alert and oriented to person, place, and time.   Skin: Skin is dry.   Nursing note and vitals reviewed.    Significant Labs:   CBC:   Recent Labs   Lab 01/02/19  0515 01/03/19  0457   WBC 13.15* 8.15   HGB 14.1 13.5*   HCT 40.8 40.6    233     CMP:   Recent Labs   Lab 01/02/19  0515 01/03/19  0457    139   K 4.2 4.3    105   CO2 26 25   * 101   BUN 12 18   CREATININE 0.9 0.9   CALCIUM 9.7 9.3   ANIONGAP 11 9   EGFRNONAA >60 >60       Significant Imaging: I have reviewed all pertinent imaging results/findings within the past 24 hours.

## 2019-01-03 NOTE — PT/OT/SLP EVAL
Occupational Therapy   Evaluation and Discharge Note    Name: Aniceto Johansen  MRN: [id number]  Admitting Diagnosis:  Abscess of left middle finger 2 Days Post-Op    Recommendations:     Discharge Recommendations:    Discharge Equipment Recommendations:  none  Barriers to discharge:  None    History:     Occupational Profile:  Living Environment: lives alone in 1 story house alone with no steps  Previous level of function:(i) with adl's and (I) with functional mobility and   Roles and Routines: occupational therapy  Equipment Used at home:  none  Assistance upon Discharge:     History reviewed. No pertinent past medical history.    Past Surgical History:   Procedure Laterality Date    EXPLORATION, TENDON Left 1/1/2019    Performed by [facility] at [facility] OR    HERNIA REPAIR      INCISION AND DRAINAGE, UPPER EXTREMITY Left 1/1/2019    Performed by [facility] at [facility] OR    IRRIGATION AND DEBRIDEMENT, UPPER EXTREMITY Left 1/1/2019    Performed by Dany Conway MD at [facility] OR       Subjective     Chief Complaint: l hand Patient/Family Comments/goals:     Pain/Comfort:  ·     Patients cultural, spiritual, Quaker conflicts given the current situation:     Objective:     Communicated with: nurse Gusman and epic chart review prior to session.  Patient found all lines intact, call button in reach and nurse Le notified and   upon OT entry to room.    General Precautions: Standard, contact(staph in l hand)   Orthopedic Precautions:N/A   Braces: N/A     Occupational Performance:    Bed Mobility:    · Patient completed Rolling/Turning to Left with  independence  · Patient completed Rolling/Turning to Right with independence  · Patient completed Scooting/Bridging with independence  · Patient completed Supine to Sit with independence  · Patient completed Sit to Supine with independence    Functional Mobility/Transfers:  · Patient completed Sit <> Stand Transfer with independence  with   "no assistive device   · Functional Mobility: (I) with functional mobility x 20 feet    Activities of Daily Living:  · Upper Body Dressing: independence .  · Lower Body Dressing: independence .    Cognitive/Visual Perceptual:  Cognitive/Psychosocial Skills:     -       Oriented to: Person, Place, Time and Situation   -       Follows Commands/attention:Follows multistep  commands  -       Communication: clear/fluent  -       Memory: No Deficits noted  -       Safety awareness/insight to disability: intact   Visual/Perceptual:      -Intact .    Physical Exam:  Upper Extremity Range of Motion:     -       Right Upper Extremity: WFL  -       Left Upper Extremity: WFL  Upper Extremity Strength:    -       Right Upper Extremity: WFL  -       Left Upper Extremity: WFL   Strength:    -       Right Upper Extremity: WFL  -       Left Upper Extremity: mmt: 4/5 grossly    AMPAC 6 Click ADL:  AMPAC Total Score: 24    Treatment & Education:    Education:    Patient left with bed in chair position with all lines intact, call button in reach and nurse gurwinder notified    Assessment:     Aniceto Johansen is a 48 y.o. male with a medical diagnosis of Abscess of left middle finger. At this time, patient is functioning at their prior level of function and does not require further acute OT services.     Clinical Decision Makin.  OT Low:  "Pt evaluation falls under low complexity for evaluation coding due to performance deficits noted in 1-3 areas as stated above and 0 co-morbities affecting current functional status. Data obtained from problem focused assessments. No modifications or assistance was required for completion of evaluation. Only brief occupational profile and history review completed."     Plan:     During this hospitalization, patient does not require further acute OT services.  Please re-consult if situation changes.    · Plan of Care Reviewed with: patient   · Pt discharged from skilled o.t. And placed on " people 's for gentle rom exercise    This Plan of care has been discussed with the patient who was involved in its development and understands and is in agreement with the identified goals and treatment plan    GOALS:   Multidisciplinary Problems     Occupational Therapy Goals     Not on file                Time Tracking:     OT Date of Treatment: 01/03/19  OT Start Time: 1600  OT Stop Time: 1625  OT Total Time (min): 25 min    Billable Minutes:Evaluation 10 minutes  Therapeutic Exercise 15 minutes    Vesta Rubio, OT  1/3/2019

## 2019-01-03 NOTE — NURSING
Pt aware urine specimen needs to be obtained. Reminded patient 3 times during shift. Patient urinated twice and discarded urine without notifying staff.

## 2019-01-04 ENCOUNTER — TELEPHONE (OUTPATIENT)
Dept: ORTHOPEDICS | Facility: CLINIC | Age: 49
End: 2019-01-04

## 2019-01-04 VITALS
DIASTOLIC BLOOD PRESSURE: 63 MMHG | TEMPERATURE: 99 F | SYSTOLIC BLOOD PRESSURE: 108 MMHG | BODY MASS INDEX: 19.13 KG/M2 | HEIGHT: 66 IN | HEART RATE: 64 BPM | WEIGHT: 119.06 LBS | RESPIRATION RATE: 15 BRPM | OXYGEN SATURATION: 95 %

## 2019-01-04 PROBLEM — L02.512 ABSCESS OF LEFT MIDDLE FINGER: Status: RESOLVED | Noted: 2018-12-31 | Resolved: 2019-01-04

## 2019-01-04 LAB
ANION GAP SERPL CALC-SCNC: 9 MMOL/L
BACTERIA SPEC AEROBE CULT: NORMAL
BASOPHILS # BLD AUTO: 0.04 K/UL
BASOPHILS NFR BLD: 0.5 %
BUN SERPL-MCNC: 15 MG/DL
CALCIUM SERPL-MCNC: 9.6 MG/DL
CHLORIDE SERPL-SCNC: 103 MMOL/L
CO2 SERPL-SCNC: 26 MMOL/L
CREAT SERPL-MCNC: 0.9 MG/DL
DIFFERENTIAL METHOD: ABNORMAL
EOSINOPHIL # BLD AUTO: 0.4 K/UL
EOSINOPHIL NFR BLD: 5.4 %
ERYTHROCYTE [DISTWIDTH] IN BLOOD BY AUTOMATED COUNT: 13.2 %
EST. GFR  (AFRICAN AMERICAN): >60 ML/MIN/1.73 M^2
EST. GFR  (NON AFRICAN AMERICAN): >60 ML/MIN/1.73 M^2
GLUCOSE SERPL-MCNC: 99 MG/DL
HCT VFR BLD AUTO: 43.4 %
HGB BLD-MCNC: 14.4 G/DL
LYMPHOCYTES # BLD AUTO: 1.7 K/UL
LYMPHOCYTES NFR BLD: 22.3 %
MCH RBC QN AUTO: 29.6 PG
MCHC RBC AUTO-ENTMCNC: 33.2 G/DL
MCV RBC AUTO: 89 FL
MONOCYTES # BLD AUTO: 0.9 K/UL
MONOCYTES NFR BLD: 11.4 %
NEUTROPHILS # BLD AUTO: 4.7 K/UL
NEUTROPHILS NFR BLD: 60.4 %
PLATELET # BLD AUTO: 252 K/UL
PMV BLD AUTO: 9.1 FL
POTASSIUM SERPL-SCNC: 4.4 MMOL/L
RBC # BLD AUTO: 4.86 M/UL
SODIUM SERPL-SCNC: 138 MMOL/L
VANCOMYCIN TROUGH SERPL-MCNC: 13.1 UG/ML
WBC # BLD AUTO: 7.75 K/UL

## 2019-01-04 PROCEDURE — 36415 COLL VENOUS BLD VENIPUNCTURE: CPT

## 2019-01-04 PROCEDURE — 80202 ASSAY OF VANCOMYCIN: CPT

## 2019-01-04 PROCEDURE — 85025 COMPLETE CBC W/AUTO DIFF WBC: CPT

## 2019-01-04 PROCEDURE — 63600175 PHARM REV CODE 636 W HCPCS: Performed by: INTERNAL MEDICINE

## 2019-01-04 PROCEDURE — 80048 BASIC METABOLIC PNL TOTAL CA: CPT

## 2019-01-04 RX ORDER — MINOCYCLINE HYDROCHLORIDE 100 MG/1
100 CAPSULE ORAL EVERY 12 HOURS
Qty: 42 CAPSULE | Refills: 0 | Status: SHIPPED | OUTPATIENT
Start: 2019-01-04 | End: 2019-01-25

## 2019-01-04 RX ORDER — ONDANSETRON 8 MG/1
8 TABLET, ORALLY DISINTEGRATING ORAL EVERY 6 HOURS PRN
Qty: 30 TABLET | Refills: 0 | Status: SHIPPED | OUTPATIENT
Start: 2019-01-04 | End: 2019-02-06 | Stop reason: SDUPTHER

## 2019-01-04 RX ORDER — MINOCYCLINE HYDROCHLORIDE 50 MG/1
200 CAPSULE ORAL ONCE
Status: DISCONTINUED | OUTPATIENT
Start: 2019-01-04 | End: 2019-01-04 | Stop reason: HOSPADM

## 2019-01-04 RX ORDER — OXYCODONE AND ACETAMINOPHEN 7.5; 325 MG/1; MG/1
1 TABLET ORAL EVERY 4 HOURS PRN
Qty: 15 TABLET | Refills: 0 | Status: SHIPPED | OUTPATIENT
Start: 2019-01-04 | End: 2019-08-09

## 2019-01-04 RX ADMIN — ONDANSETRON 4 MG: 2 INJECTION INTRAMUSCULAR; INTRAVENOUS at 06:01

## 2019-01-04 NOTE — TELEPHONE ENCOUNTER
Spoke to patient's father in regards to wound check appointment on Monday 1/7/19. Got him scheduled for 8:30am with Joey Graham. He verbalized his understanding and agreed.    ----- Message from Yosi Quezada sent at 1/4/2019 11:07 AM CST -----  Contact: Sujata Weaver - [facility]   States she's calling to have pt worked in on Monday for PO and pt can be reached at [phone] father number - scout or [phone] pt father's number//thanks/dbw     Discharge orders is to be seen on Monday

## 2019-01-04 NOTE — NURSING
D/c instructions reviewed with patient, verbalized understanding. IV removed, tolerated well. Appointment not able to be made, Ortho nurse not available, however I did inform them the patient needs to follow up on Monday and she said they would have the nurse call the patient with updated phone numbers given and try to fit him in on Monday. Pt wheeled out with PCT. VANCE.

## 2019-01-04 NOTE — PLAN OF CARE
Problem: Adult Inpatient Plan of Care  Goal: Plan of Care Review  Outcome: Ongoing (interventions implemented as appropriate)  Contact precautions initiated due to MRSA. Patient educated on meaning of contact precautions and verbalized understanding. Hand soaked in betadine 2 times today, will direct night shift to soak once more before going to bed. Gauze dressing between betadine soaks. IV antibiotics. Pain controlled with PRN medication. No falls/ adverse events. Chart check complete. Will continue to monitor.

## 2019-01-04 NOTE — PROGRESS NOTES
Clinical Pharmacy Progress Note - Vancomycin dosing    Indication: MRSA in finger abscess (s/p I&D)  Goal trough: 10-15    Concomitant antibiotic therapy: Minocycline 200 mg PO x 1 today. NP plans to order Minocycline 100 mg PO q12h to start tomorrow for patient to continue at home.   Estimated Creatinine Clearance: 78.3 mL/min (based on SCr of 0.9 mg/dL).  Serum creatinine stable    Lab Results   Component Value Date    WBC 7.75 01/04/2019     Tmax/24h 98.8 F  Cultures:   Blood x 2 (1/2/19) NGTD  Wound culture from left hand finger abscess growing MRSA sensitive to Vancomycin (ABHINAV = 2), also sensitive to Bactrim and Tetracycline. Minocycline is a Tetracycline derivative.     - Vancomycin trough level 13.1 mcg/mL at 0950 today prior to the 4th dose of 1 g q12h regimen. (Trough collected approximately 11.5 hours post dose of 1000 mg on 1/3/18.)   - Trough is within therapeutic goal range of 10-15 mcg/mL for cellulitis.     Plan:  - Discontinue Vancomycin per NP. De-escalate to PO Minocycline.      Thank you for allowing us to participate in this patient's care.    Judith Locke, PharmSIRI 01/04/2019 10:50 AM

## 2019-01-04 NOTE — PLAN OF CARE
Problem: Adult Inpatient Plan of Care  Goal: Plan of Care Review  Outcome: Ongoing (interventions implemented as appropriate)  Plan of care discussed with patient. Possible side effects of medications was discussed. No concerns voiced. Pt denies pain at this time. Pain controled with PO medications, positioning, and relaxation techniques. Ambulating well independently. Remains free from injuries. Side rails up, nonskid socks placed, call bell within reach, personal area free from clutter. Will continue to monitor

## 2019-01-04 NOTE — PROGRESS NOTES
Orthopedic Sports Surgery Rounding Note    2019    Patient seen and examined. Doing well without new complains. Pain is well controlled. Dressing with minimal drainage. Patient reports Betadine soaks TID yesterday. Dressing taken off at bedside and nurse alerted to come and do soaks. Preliminary wound culture results growing S. Aureus. Patient complaining of mild pain.    Vital Signs:    Vitals:    19 1553 19 1934 19 2340 19 0335   BP: (!) 105/55 115/71 121/70 111/65   BP Location: Left arm  Left arm Left arm   Patient Position: Lying Lying Lying Lying   Pulse: 75 63 68 66   Resp: 18 14 18 18   Temp: 98.8 °F (37.1 °C) 97.6 °F (36.4 °C) 98.8 °F (37.1 °C) 97.7 °F (36.5 °C)   TempSrc: Oral Oral Oral Oral   SpO2: 97% 99% 96% 96%   Weight:       Height:           Temp (48hrs), Av.3 °F (36.8 °C), Min:97.6 °F (36.4 °C), Max:99 °F (37.2 °C)      Recent Lab Results:  Recent Labs   Lab 18  1107 19  0515 19  0457    138 139   K 4.3 4.2 4.3    101 105   CO2 22* 26 25   BUN 10 12 18   CREATININE 0.8 0.9 0.9   GLU 93 115* 101   CALCIUM 9.3 9.7 9.3     Recent Labs     19  0515 19  0457   WBC 13.15* 8.15   HGB 14.1 13.5*   HCT 40.8 40.6    233         Physical Exam:  A/O *3. No acute distress    Left Upper Extremity  Dressing/Incision C/D/I   Sensation Present  Cap refill less than 2 sec  Compartment soft, NT  Finger Flexion/Extension intact      Assessment:  Abscess of left middle finger and flexor tenosynovitis s/p I&D, POD # 3    Plan:  Pain Controlled  PT/OT- Hand/Finger ROM  DVT prophylaxis   Left UE NWB  Antibiotics per Hospital Team/ID  Warm Water-Betadine Soaks with Bandage Changes TID  Follow up cultures - preliminary results S. Aureus  Will need clinic f/up on discharge-f/up with Joey Dodd on 19     Joey Conway MD  Orthopedic Surgery Sports Medicine

## 2019-01-04 NOTE — DISCHARGE SUMMARY
[facility] Hospital Medicine  Discharge Summary      Patient Name: Aniceto Johansen  MRN: [id number]  Admission Date: 12/31/2018  Hospital Length of Stay: 4 days  Discharge Date and Time:  01/04/2019 3:48 PM  Attending Physician: No att. providers found   Discharging Provider: Eugene Eddy NP  Primary Care Provider: Primary Doctor Janine      HPI:   Aniceto Johansen is a 48 year old male with history of tobacco abuse who presents to ED with left middle swelling and pain. Symptoms began one day ago and has progressively worsened. He denies injury or trauma to affected extremity. He smokes 1 ppd. Denies ETOH or recreational use.     In ED, CT Hand reflected middle finger soft tissue swelling involving anterior tendon with possible early abscess. He is about to undergo bedside I&D. Orthopedic surgery has been consulted.       Procedure(s) (LRB):  INCISION AND DRAINAGE, UPPER EXTREMITY (Left)  IRRIGATION AND DEBRIDEMENT, UPPER EXTREMITY (Left)  EXPLORATION, TENDON (Left)      Hospital Course:   49 y/O wm admitted with a dx of Abscess of left middle finger  and  flexor tenosynovitis . He ws started on iv Levaquin and IV vanc . He has a bedside I&D per  Ortho .  The MRI did not show No evidence of osteomyelitis.  No evidence of abscess.  No evidence of tenosynovitis. Pt was taken to the OR by  Ortho   And performed a INCISION AND DRAINAGE (I&D) (Right) index finger abscess, incision and drainage flexor tenosynovitis, excisional debridement skin, subcutaneous tissue, muscle, fascia. The Post Op dx Abscess of left middle finger  and  flexor tenosynovitis. The ESBL 5 ml . The wound Cx is positive for staph . MRI negative for OM and tenosynovitis.    As of 1/3/18 Orthopedic surgery recommended aggressive betadine soaks TID. Occupational therapy has been consulted. Wound culture growing MRSA. Plan at discharge is complete 3 weeks minocycline. 1/4/19 No acute issues overnight. Ortho evaluated the patient this  morning and felt the patient could discharge from their perspective. The patient was seen and examined today and deemed stable for discharge. The patient will discharge home on minocycline for 3 weeks. The patient will follow up with Joey ZHU with Ortho on 1/7/19.              Consults:   Consults (From admission, onward)        Status Ordering Provider     Inpatient consult to Orthopedic Surgery  Once     Provider:  Dany Conway MD    Completed SHELLY XIE     Inpatient consult to Orthopedic Surgery  Once     Provider:  Dany Conway MD    Completed CHANDAN TIPTON          No new Assessment & Plan notes have been filed under this hospital service since the last note was generated.  Service: Hospital Medicine    Final Active Diagnoses:    Diagnosis Date Noted POA    Tobacco abuse [Z72.0] 12/31/2018 Yes      Problems Resolved During this Admission:    Diagnosis Date Noted Date Resolved POA    PRINCIPAL PROBLEM:  Abscess of left middle finger [L02.512] 12/31/2018 01/04/2019 Yes       Discharged Condition: stable    Disposition: Home or Self Care    Follow Up:  Follow-up Information     Joey Graham PA-C. Schedule an appointment as soon as possible for a visit on 1/7/2019.    Specialty:  Orthopedic Surgery  Contact information:  [address] [place] [address] [address] [place] [id number]  [phone]                 Patient Instructions:   No discharge procedures on file.    Significant Diagnostic Studies:   Imaging Results          CT Hand With Contrast Left (Final result)  Result time 12/31/18 13:06:11    Final result by Dennis Lopez Jr., MD (12/31/18 13:06:11)                 Impression:      No acute bone findings.  Nothing obvious for osteomyelitis.  Two.  Focal area of soft tissue swelling along the ventral aspect of the long finger, with possibilities as above.    All CT scans at this facility are performed  using dose modulation techniques as appropriate to performed exam  including the following:  automated exposure control; adjustment of mA and/or kV according to the patients size (this includes techniques or standardized protocols for targeted exams where dose is matched to indication/reason for exam: i.e. extremities or head);  iterative reconstruction technique.      Electronically signed by: Jhonathan Gutierrez MD  Date:    12/31/2018  Time:    13:06             Narrative:    EXAMINATION:  CT HAND WITH CONTRAST LEFT    CLINICAL HISTORY:  Hand erythema, swelling, cellulitis suspected;r/o flexor tenosynovitis left 3rd finger;    TECHNIQUE:  Postcontrast images of the left hand/long finger were obtained.  50 cc Omnipaque 350 was administered.  Multiplanar reconstruction images were produced.    COMPARISON:  None    FINDINGS:  Bone alignment of the wrist and hand appears anatomic.  No fractures.  No bone destruction.  Carpal bones appear intact.  Benign area of sclerosis in the midpole of the scaphoid bone, likely a benign bone island.  No findings of obvious osteomyelitis.    Focal area of soft tissue prominence along the ventral portion of the middle phalanx of the long finger.  Soft tissue swelling is noted and at an anterior to the flexor tendon at this region.  Focal area of tenosynovitis is possible.  Soft tissue swelling, extends over length of approximately 1.5 cm.  Small phlegmon/early abscess is also possible.  Imaging 84 series 105, image 54, series 3 if additional imaging is indicated, consider MRI evaluation.                                  Pending Diagnostic Studies:     None         Medications:  Reconciled Home Medications:      Medication List      START taking these medications    minocycline 100 MG capsule  Commonly known as:  MINOCIN,DYNACIN  Take 1 capsule (100 mg total) by mouth every 12 (twelve) hours. for 21 days     ondansetron 8 MG Tbdl  Commonly known as:  ZOFRAN-ODT  Take 1 tablet (8 mg total) by mouth every 6 (six) hours as needed.      oxyCODONE-acetaminophen 7.5-325 mg per tablet  Commonly known as:  PERCOCET  Take 1 tablet by mouth every 4 (four) hours as needed.            Indwelling Lines/Drains at time of discharge:   Lines/Drains/Airways          None          Time spent on the discharge of patient: > 30 minutes  Patient was seen and examined on the date of discharge and determined to be suitable for discharge.         Eugene Eddy NP  Department of Hospital Medicine  [facility]

## 2019-01-04 NOTE — DISCHARGE INSTRUCTIONS
Warm Water-Betadine Soaks with Bandage Changes three times daily.   Left upper extremity-nonweight bearing  Keep left upper extremity elevated

## 2019-01-07 ENCOUNTER — OFFICE VISIT (OUTPATIENT)
Dept: ORTHOPEDICS | Facility: CLINIC | Age: 49
End: 2019-01-07
Payer: MEDICAID

## 2019-01-07 VITALS — HEART RATE: 80 BPM | DIASTOLIC BLOOD PRESSURE: 72 MMHG | SYSTOLIC BLOOD PRESSURE: 116 MMHG | RESPIRATION RATE: 12 BRPM

## 2019-01-07 DIAGNOSIS — Z98.890 POSTOPERATIVE STATE: ICD-10-CM

## 2019-01-07 DIAGNOSIS — Z98.890 STATUS POST INCISION AND DRAINAGE: Primary | ICD-10-CM

## 2019-01-07 LAB
BACTERIA BLD CULT: NORMAL
BACTERIA BLD CULT: NORMAL

## 2019-01-07 PROCEDURE — 99213 OFFICE O/P EST LOW 20 MIN: CPT | Mod: PBBFAC,PO | Performed by: PHYSICIAN ASSISTANT

## 2019-01-07 PROCEDURE — 99024 PR POST-OP FOLLOW-UP VISIT: ICD-10-PCS | Mod: ,,, | Performed by: PHYSICIAN ASSISTANT

## 2019-01-07 PROCEDURE — 99999 PR PBB SHADOW E&M-EST. PATIENT-LVL III: CPT | Mod: PBBFAC,,, | Performed by: PHYSICIAN ASSISTANT

## 2019-01-07 PROCEDURE — 99024 POSTOP FOLLOW-UP VISIT: CPT | Mod: ,,, | Performed by: PHYSICIAN ASSISTANT

## 2019-01-07 PROCEDURE — 99999 PR PBB SHADOW E&M-EST. PATIENT-LVL III: ICD-10-PCS | Mod: PBBFAC,,, | Performed by: PHYSICIAN ASSISTANT

## 2019-01-07 NOTE — PLAN OF CARE
01/07/19 1558   Final Note   Assessment Type Final Discharge Note   Anticipated Discharge Disposition Home   Right Care Referral Info   Post Acute Recommendation No Care

## 2019-01-09 PROBLEM — Z98.890 STATUS POST INCISION AND DRAINAGE: Status: ACTIVE | Noted: 2019-01-09

## 2019-01-09 PROBLEM — M65.9 FLEXOR TENOSYNOVITIS OF FINGER: Status: ACTIVE | Noted: 2019-01-09

## 2019-01-09 NOTE — PROGRESS NOTES
Subjective:     Patient ID: Aniceto Johansen is a 48 y.o. male.    Chief Complaint: Post-op Evaluation, Pain, and Follow-up of the Left Hand    Surgery Date: 01/01/2019      Surgeon(s) and Role:     * Dany Conway MD - Primary     ASSISTANT: Joey ZHU     The use of Joey ZHU  was medically necessary for patient positioning, skin retraction, closure and assistance with this procedure. The procedure could not be performed properly without the use of her as an assistant. There was no qualified resident/fellow available for assistance with this procedure.      Pre-op Diagnosis:  Abscess of left middle finger, flexor tenosynovitis     Post-op Diagnosis: Same     Procedure(s) (LRB):  INCISION AND DRAINAGE (I&D) left middle finger abscess, incision and drainage flexor tenosynovitis, excisional debridement skin, subcutaneous tissue, muscle, fascia       Mr. Johansen is a 47 yo male who presents for 1 week postoperative visit of I&D of left middle finger abscess and flexor tenosynovitis. Patient has no complaints today. Pain is well controlled.  Patient did not have to fill his Percocet Rx.  Patient has open wound to left middle finger near the DIP and palmar hand along flexor tendon. Minimal serosanguineous drainage. No signs of wound infection.  He has been taking his antibiotics regimen.  Patient has been nonweightbearing to his left upper extremity.  Patient has been wearing waterproof bandage.         History reviewed. No pertinent past medical history.  Past Surgical History:   Procedure Laterality Date    EXPLORATION, TENDON Left 1/1/2019    Performed by [facility] at [facility]    HAND SURGERY Left 01/01/2019    I&D L middle finger    HERNIA REPAIR      INCISION AND DRAINAGE, UPPER EXTREMITY Left 1/1/2019    Performed by [facility] MD at [facility] OR    IRRIGATION AND DEBRIDEMENT, UPPER EXTREMITY Left 1/1/2019    Performed by Dany Conway MD at [facility] OR      History reviewed. No pertinent family history.  Social History     Socioeconomic History    Marital status: Single     Spouse name: Not on file    Number of children: Not on file    Years of education: Not on file    Highest education level: Not on file   Social Needs    Financial resource strain: Not on file    Food insecurity - worry: Not on file    Food insecurity - inability: Not on file    Transportation needs - medical: Not on file    Transportation needs - non-medical: Not on file   Occupational History    Not on file   Tobacco Use    Smoking status: Current Every Day Smoker     Packs/day: 1.00     Types: Cigarettes    Smokeless tobacco: Never Used   Substance and Sexual Activity    Alcohol use: No     Frequency: Never    Drug use: No    Sexual activity: Not on file   Other Topics Concern    Not on file   Social History Narrative    Not on file        Medication List           Accurate as of 1/7/19 11:59 PM. If you have any questions, ask your nurse or doctor.               CONTINUE taking these medications    minocycline 100 MG capsule  Commonly known as:  MINOCIN,DYNACIN  Take 1 capsule (100 mg total) by mouth every 12 (twelve) hours. for 21 days     ondansetron 8 MG Tbdl  Commonly known as:  ZOFRAN-ODT  Take 1 tablet (8 mg total) by mouth every 6 (six) hours as needed.     oxyCODONE-acetaminophen 7.5-325 mg per tablet  Commonly known as:  PERCOCET  Take 1 tablet by mouth every 4 (four) hours as needed.          Review of patient's allergies indicates:   Allergen Reactions    Pcn [penicillins] Hives    Sulfa (sulfonamide antibiotics) Hives     Review of Systems   Constitution: Negative for fever and night sweats.   HENT: Negative for hearing loss.    Eyes: Negative for blurred vision and visual disturbance.   Cardiovascular: Negative for chest pain and leg swelling.   Respiratory: Negative for shortness of breath.    Endocrine: Negative for polyuria.   Hematologic/Lymphatic: Negative  for bleeding problem.   Skin: Negative for rash.   Musculoskeletal: Negative for back pain, joint pain, joint swelling, muscle cramps and muscle weakness.   Gastrointestinal: Negative for melena.   Genitourinary: Negative for hematuria.   Neurological: Negative for loss of balance, numbness and paresthesias.   Psychiatric/Behavioral: Negative for altered mental status.       Objective:   There is no height or weight on file to calculate BMI.  Vitals:    01/07/19 0837   BP: 116/72   Pulse: 80   Resp: 12       General: Aniceto is well-developed, well-nourished, appears stated age, in no acute distress, alert and oriented to time, place and person.       General    Constitutional: He is oriented to person, place, and time. He appears well-developed and well-nourished. No distress.   HENT:   Head: Normocephalic and atraumatic.   Right Ear: External ear normal.   Left Ear: External ear normal.   Nose: Nose normal.   Eyes: EOM are normal. Pupils are equal, round, and reactive to light. Right eye exhibits no discharge. Left eye exhibits no discharge.   Neck: Normal range of motion.   Cardiovascular: Intact distal pulses.    Pulmonary/Chest: Effort normal. No respiratory distress.   Abdominal: Soft.   Neurological: He is alert and oriented to person, place, and time.   Psychiatric: He has a normal mood and affect. His behavior is normal. Judgment and thought content normal.         Left Hand/Wrist Exam     Inspection   Scars: Wrist - absent Hand -  absent  Effusion: Wrist - absent Hand -  absent  Bruising: Wrist - absent Hand -  absent  Deformity: Wrist - absent Hand -  absent    Pain   Hand - The patient exhibits pain of the middle IP.    Swelling   Hand - The patient is swollen on the middle IP.    Comments:  Left upper extremity  Incision at left middle finger DIP  Incision at palmar hand along left middle finger flexor tendon  Minimal serosanguineous drainage  Skin peeling at incision along the IP joint  No  erythema, minimal swelling of 3rd digit  Compartment soft, nontender  2+ radial pulses  Normal capillary refill  Sensation intact  Middle Finger flexion 10° less than right hand  Middle finger extension within normal limits          Vascular Exam       Capillary Refill  Left Hand: normal capillary refill      Assessment:     Encounter Diagnoses   Name Primary?    Status post incision and drainage Yes    Postoperative state         Plan:     Healing wound present to left middle finger and palmar hand  Continue waterproof bandages  Monitor for signs of infection including increasing swelling, increasing erythema, increasing drainage, fever, chills, etc.   Continue antibiotics regimen  Pain well controlled  Continue nonweightbearing status of left upper extremity  Follow-up for 2 weeks postoperative     Patient verbalizes understanding and agrees with the above plan.    Joey Graham PA-C  Orthopedic Surgery/Sports Medicine

## 2019-01-18 ENCOUNTER — OFFICE VISIT (OUTPATIENT)
Dept: ORTHOPEDICS | Facility: CLINIC | Age: 49
End: 2019-01-18
Payer: MEDICAID

## 2019-01-18 VITALS
TEMPERATURE: 97 F | WEIGHT: 119 LBS | HEIGHT: 66 IN | HEART RATE: 71 BPM | DIASTOLIC BLOOD PRESSURE: 72 MMHG | RESPIRATION RATE: 18 BRPM | BODY MASS INDEX: 19.13 KG/M2 | SYSTOLIC BLOOD PRESSURE: 118 MMHG

## 2019-01-18 DIAGNOSIS — Z98.890 POSTOPERATIVE STATE: ICD-10-CM

## 2019-01-18 DIAGNOSIS — Z98.890 STATUS POST INCISION AND DRAINAGE: Primary | ICD-10-CM

## 2019-01-18 PROCEDURE — 99024 POSTOP FOLLOW-UP VISIT: CPT | Mod: ,,, | Performed by: PHYSICIAN ASSISTANT

## 2019-01-18 PROCEDURE — 99999 PR PBB SHADOW E&M-EST. PATIENT-LVL III: CPT | Mod: PBBFAC,,, | Performed by: PHYSICIAN ASSISTANT

## 2019-01-18 PROCEDURE — 99999 PR PBB SHADOW E&M-EST. PATIENT-LVL III: ICD-10-PCS | Mod: PBBFAC,,, | Performed by: PHYSICIAN ASSISTANT

## 2019-01-18 PROCEDURE — 99213 OFFICE O/P EST LOW 20 MIN: CPT | Mod: PBBFAC,PN | Performed by: PHYSICIAN ASSISTANT

## 2019-01-18 PROCEDURE — 99024 PR POST-OP FOLLOW-UP VISIT: ICD-10-PCS | Mod: ,,, | Performed by: PHYSICIAN ASSISTANT

## 2019-01-18 NOTE — PROGRESS NOTES
Subjective:     Patient ID: Aniceto Johansen is a 48 y.o. male.    Chief Complaint: Pain of the Left Hand    Surgery Date: 01/01/2019      Surgeon(s) and Role:     * Dany Conway MD - Primary     ASSISTANT: Joey ZHU     The use of Joey ZHU  was medically necessary for patient positioning, skin retraction, closure and assistance with this procedure. The procedure could not be performed properly without the use of her as an assistant. There was no qualified resident/fellow available for assistance with this procedure.      Pre-op Diagnosis:  Abscess of left middle finger, flexor tenosynovitis     Post-op Diagnosis: Same     Procedure(s) (LRB):  INCISION AND DRAINAGE (I&D) left middle finger abscess, incision and drainage flexor tenosynovitis, excisional debridement skin, subcutaneous tissue, muscle, fascia       Mr. Johansen is a 47 yo male who presents for 2 week postoperative visit of I&D of left middle finger abscess and flexor tenosynovitis. Patient has no complaints today. Pain is well controlled.  Patient did not have to fill his Percocet Rx.  Patient has healing wounds to left middle finger near the DIP and palmar hand along flexor tendon. No drainage. No signs of wound infection.  He has been taking his antibiotics regimen.  Patient has been nonweightbearing to his left upper extremity.  Patient has been wearing waterproof bandage.       Pain   Pertinent negatives include no chest pain, fever, joint swelling, numbness or rash.       History reviewed. No pertinent past medical history.  Past Surgical History:   Procedure Laterality Date    EXPLORATION, TENDON Left 1/1/2019    Performed by Dany Conway [facility] at [facility] [place]    HAND SURGERY Left 01/01/2019    I&D L middle finger    HERNIA REPAIR      INCISION AND DRAINAGE, UPPER EXTREMITY Left 1/1/2019    Performed by Dany Conway MD at [facility] [place]    IRRIGATION AND DEBRIDEMENT, UPPER EXTREMITY Left 1/1/2019     Performed by D[facility] Conway MD at [facility] [place]     History reviewed. No pertinent family history.  Social History     Socioeconomic History    Marital status: Single     Spouse name: Not on file    Number of children: Not on file    Years of education: Not on file    Highest education level: Not on file   Social Needs    Financial resource strain: Not on file    Food insecurity - worry: Not on file    Food insecurity - inability: Not on file    Transportation needs - medical: Not on file    Transportation needs - non-medical: Not on file   Occupational History    Not on file   Tobacco Use    Smoking status: Current Every Day Smoker     Packs/day: 1.00     Types: Cigarettes    Smokeless tobacco: Never Used   Substance and Sexual Activity    Alcohol use: No     Frequency: Never    Drug use: No    Sexual activity: Not on file   Other Topics Concern    Not on file   Social History Narrative    Not on file        Medication List           Accurate as of 1/18/19  9:20 AM. If you have any questions, ask your nurse or doctor.               CONTINUE taking these medications    minocycline 100 MG capsule  Commonly known as:  MINOCIN,DYNACIN  Take 1 capsule (100 mg total) by mouth every 12 (twelve) hours. for 21 days     ondansetron 8 MG Tbdl  Commonly known as:  ZOFRAN-ODT  Take 1 tablet (8 mg total) by mouth every 6 (six) hours as needed.     oxyCODONE-acetaminophen 7.5-325 mg per tablet  Commonly known as:  PERCOCET  Take 1 tablet by mouth every 4 (four) hours as needed.          Review of patient's allergies indicates:   Allergen Reactions    Pcn [penicillins] Hives    Sulfa (sulfonamide antibiotics) Hives     Review of Systems   Constitution: Negative for fever and night sweats.   HENT: Negative for hearing loss.    Eyes: Negative for blurred vision and visual disturbance.   Cardiovascular: Negative for chest pain and leg swelling.   Respiratory: Negative for shortness of breath.    Endocrine:  Negative for polyuria.   Hematologic/Lymphatic: Negative for bleeding problem.   Skin: Negative for rash.   Musculoskeletal: Negative for back pain, joint pain, joint swelling, muscle cramps and muscle weakness.   Gastrointestinal: Negative for melena.   Genitourinary: Negative for hematuria.   Neurological: Negative for loss of balance, numbness and paresthesias.   Psychiatric/Behavioral: Negative for altered mental status.       Objective:   Body mass index is 19.21 kg/m².  Vitals:    01/18/19 0839   BP: 118/72   Pulse: 71   Resp: 18   Temp: 97 °F (36.1 °C)       General: Aniceto is well-developed, well-nourished, appears stated age, in no acute distress, alert and oriented to time, place and person.       General    Constitutional: He is oriented to person, place, and time. He appears well-developed and well-nourished. No distress.   HENT:   Head: Normocephalic and atraumatic.   Right Ear: External ear normal.   Left Ear: External ear normal.   Nose: Nose normal.   Eyes: EOM are normal. Pupils are equal, round, and reactive to light. Right eye exhibits no discharge. Left eye exhibits no discharge.   Neck: Normal range of motion.   Cardiovascular: Intact distal pulses.    Pulmonary/Chest: Effort normal. No respiratory distress.   Abdominal: Soft.   Neurological: He is alert and oriented to person, place, and time.   Psychiatric: He has a normal mood and affect. His behavior is normal. Judgment and thought content normal.         Left Hand/Wrist Exam     Inspection   Scars: Wrist - absent Hand -  absent  Effusion: Wrist - absent Hand -  absent  Bruising: Wrist - absent Hand -  absent  Deformity: Wrist - absent Hand -  absent    Pain   Hand - The patient exhibits pain of the middle IP.    Swelling   Hand - The patient is swollen on the middle IP.    Comments:  Left upper extremity  Incision healing at left middle finger DIP  Incision healing at palmar hand along left middle finger flexor tendon  No drainage  present  No erythema, no swelling  Compartment soft, nontender  2+ radial pulses  Normal capillary refill  Sensation intact  Middle Finger flexion 10° less than right hand  Middle finger extension within normal limits          Vascular Exam       Capillary Refill  Left Hand: normal capillary refill      Assessment:     Encounter Diagnoses   Name Primary?    Status post incision and drainage Yes    Postoperative state         Plan:     Healing wound present to left middle finger and palmar hand  Continue waterproof bandages  Monitor for signs of infection including increasing swelling, increasing erythema, increasing drainage, fever, chills, etc.   Continue antibiotics regimen  Pain well controlled  Continue nonweightbearing status of left upper extremity  Follow-up in 4 weeks     Patient verbalizes understanding and agrees with the above plan.    Joey Graham PA-C  Orthopedic Surgery/Sports Medicine

## 2019-02-05 ENCOUNTER — TELEPHONE (OUTPATIENT)
Dept: NEUROSURGERY | Facility: CLINIC | Age: 49
End: 2019-02-05

## 2019-02-05 NOTE — TELEPHONE ENCOUNTER
Returned the patients call. I asked the patient if he was having fever and chills patient stated that no he was not. I offered patient an appointment for 8am in the morning 2/6/19 to be evaluated in the office with Joey DOWLING. Patient was happy that I called him back and offered him an appointment. Patient stated he will be at this appointment in the morning. Patient and his father Thanked me for calling this back so quickly                    ----- Message from Joey Graham PA-C sent at 2/5/2019 12:28 PM CST -----  Contact: Pt   Call him back and see if having fever/chills/drainage. Put him in the 4:30 appt for tomo afternoon but tell him to come in the AM and we will see him. Preferably before 11 and if he could come at 8:30 that would be great.     Thanks!    Joey    ----- Message -----  From: Genrty Casiano MA  Sent: 2/5/2019  10:48 AM  To: Joey Graham PA-C        ----- Message -----  From: Lois Francisco  Sent: 2/5/2019  10:41 AM  To: Gladys Cook Staff    Pt called and stated his pointer finger on his left hand is swollen and tender and needs to speak to the nurse. He can be reached at [phone] or [phone].    Thanks,  TF

## 2019-02-06 ENCOUNTER — OFFICE VISIT (OUTPATIENT)
Dept: ORTHOPEDICS | Facility: CLINIC | Age: 49
End: 2019-02-06
Payer: MEDICAID

## 2019-02-06 VITALS
SYSTOLIC BLOOD PRESSURE: 103 MMHG | HEIGHT: 66 IN | BODY MASS INDEX: 19.13 KG/M2 | RESPIRATION RATE: 18 BRPM | TEMPERATURE: 98 F | HEART RATE: 72 BPM | WEIGHT: 119 LBS | DIASTOLIC BLOOD PRESSURE: 69 MMHG

## 2019-02-06 DIAGNOSIS — L03.012 CELLULITIS OF FINGER OF LEFT HAND: Primary | ICD-10-CM

## 2019-02-06 PROCEDURE — 99024 POSTOP FOLLOW-UP VISIT: CPT | Mod: ,,, | Performed by: PHYSICIAN ASSISTANT

## 2019-02-06 PROCEDURE — 99999 PR PBB SHADOW E&M-EST. PATIENT-LVL III: CPT | Mod: PBBFAC,,, | Performed by: PHYSICIAN ASSISTANT

## 2019-02-06 PROCEDURE — 99213 OFFICE O/P EST LOW 20 MIN: CPT | Mod: PBBFAC,PN | Performed by: PHYSICIAN ASSISTANT

## 2019-02-06 PROCEDURE — 99024 PR POST-OP FOLLOW-UP VISIT: ICD-10-PCS | Mod: ,,, | Performed by: PHYSICIAN ASSISTANT

## 2019-02-06 PROCEDURE — 99999 PR PBB SHADOW E&M-EST. PATIENT-LVL III: ICD-10-PCS | Mod: PBBFAC,,, | Performed by: PHYSICIAN ASSISTANT

## 2019-02-06 RX ORDER — ONDANSETRON 8 MG/1
8 TABLET, ORALLY DISINTEGRATING ORAL EVERY 6 HOURS PRN
Qty: 30 TABLET | Refills: 0 | Status: SHIPPED | OUTPATIENT
Start: 2019-02-06 | End: 2019-08-09

## 2019-02-06 RX ORDER — CLINDAMYCIN HYDROCHLORIDE 300 MG/1
300 CAPSULE ORAL EVERY 6 HOURS
Qty: 28 CAPSULE | Refills: 0 | Status: SHIPPED | OUTPATIENT
Start: 2019-02-06 | End: 2019-02-13

## 2019-02-06 NOTE — PROGRESS NOTES
Subjective:     Patient ID: Aniceto Johansen is a 48 y.o. male.    Chief Complaint: Post-op Evaluation, Pain, and Swelling of the Left Knee    Surgery Date: 01/01/2019      Surgeon(s) and Role:     * Dany Conway MD - Primary     ASSISTANT: Joey HZU     The use of Joey ZHU  was medically necessary for patient positioning, skin retraction, closure and assistance with this procedure. The procedure could not be performed properly without the use of her as an assistant. There was no qualified resident/fellow available for assistance with this procedure.      Pre-op Diagnosis:  Abscess of left middle finger, flexor tenosynovitis     Post-op Diagnosis: Same     Procedure(s) (LRB):  INCISION AND DRAINAGE (I&D) left middle finger abscess, incision and drainage flexor tenosynovitis, excisional debridement skin, subcutaneous tissue, muscle, fascia       Mr. Johansen is a 47 yo male who presents for 4 week postoperative visit of I&D of left middle finger abscess and flexor tenosynovitis.  Patient came in with concerns of swelling and pain of his left middle finger and left index finger today. Patient did not finish the last 5 days of his antibiotics because his dog ate them. Patient denies fevers, chills, and drainage.       Pain   Associated symptoms include joint swelling. Pertinent negatives include no chest pain, fever, numbness or rash.   Swelling   Associated symptoms include joint swelling. Pertinent negatives include no chest pain, fever, numbness or rash.       History reviewed. No pertinent past medical history.  Past Surgical History:   Procedure Laterality Date    EXPLORATION, TENDON Left 1/1/2019    Performed [facility] at [facility] [place]    HAND SURGERY Left 01/01/2019    I&D L middle finger    HERNIA REPAIR      INCISION AND DRAINAGE, UPPER EXTREMITY Left 1/1/2019    Performed by [facility] at [facility] [place]    IRRIGATION AND DEBRIDEMENT, UPPER EXTREMITY Left  1/1/2019    Performed by [facility] at [facility] [place]     History reviewed. No pertinent family history.  Social History     Socioeconomic History    Marital status: Single     Spouse name: Not on file    Number of children: Not on file    Years of education: Not on file    Highest education level: Not on file   Social Needs    Financial resource strain: Not on file    Food insecurity - worry: Not on file    Food insecurity - inability: Not on file    Transportation needs - medical: Not on file    Transportation needs - non-medical: Not on file   Occupational History    Not on file   Tobacco Use    Smoking status: Current Every Day Smoker     Packs/day: 1.00     Types: Cigarettes    Smokeless tobacco: Never Used   Substance and Sexual Activity    Alcohol use: No     Frequency: Never    Drug use: No    Sexual activity: Not on file   Other Topics Concern    Not on file   Social History Narrative    Not on file     Medication List with Changes/Refills   New Medications    CLINDAMYCIN (CLEOCIN) 300 MG CAPSULE    Take 1 capsule (300 mg total) by mouth every 6 (six) hours. for 7 days   Current Medications    OXYCODONE-ACETAMINOPHEN (PERCOCET) 7.5-325 MG PER TABLET    Take 1 tablet by mouth every 4 (four) hours as needed.   Changed and/or Refilled Medications    Modified Medication Previous Medication    ONDANSETRON (ZOFRAN-ODT) 8 MG TBDL ondansetron (ZOFRAN-ODT) 8 MG TbDL       Take 1 tablet (8 mg total) by mouth every 6 (six) hours as needed.    Take 1 tablet (8 mg total) by mouth every 6 (six) hours as needed.     Review of patient's allergies indicates:   Allergen Reactions    Pcn [penicillins] Hives    Sulfa (sulfonamide antibiotics) Hives     Review of Systems   Constitution: Negative for fever and night sweats.   HENT: Negative for hearing loss.    Eyes: Negative for blurred vision and visual disturbance.   Cardiovascular: Negative for chest pain and leg swelling.   Respiratory: Negative  for shortness of breath.    Endocrine: Negative for polyuria.   Hematologic/Lymphatic: Negative for bleeding problem.   Skin: Negative for rash.   Musculoskeletal: Positive for joint pain and joint swelling. Negative for back pain, muscle cramps and muscle weakness.   Gastrointestinal: Negative for melena.   Genitourinary: Negative for hematuria.   Neurological: Negative for loss of balance, numbness and paresthesias.   Psychiatric/Behavioral: Negative for altered mental status.       Objective:   Body mass index is 19.21 kg/m².  Vitals:    02/06/19 0832   BP: 103/69   Pulse: 72   Resp: 18   Temp: 97.9 °F (36.6 °C)       General: Aniceto is well-developed, well-nourished, appears stated age, in no acute distress, alert and oriented to time, place and person.       General    Constitutional: He is oriented to person, place, and time. He appears well-developed and well-nourished. No distress.   HENT:   Head: Normocephalic and atraumatic.   Right Ear: External ear normal.   Left Ear: External ear normal.   Nose: Nose normal.   Eyes: EOM are normal. Pupils are equal, round, and reactive to light. Right eye exhibits no discharge. Left eye exhibits no discharge.   Neck: Normal range of motion.   Cardiovascular: Intact distal pulses.    Pulmonary/Chest: Effort normal. No respiratory distress.   Abdominal: Soft.   Neurological: He is alert and oriented to person, place, and time.   Psychiatric: He has a normal mood and affect. His behavior is normal. Judgment and thought content normal.         Left Hand/Wrist Exam     Inspection   Scars: Wrist - absent Hand -  absent  Effusion: Wrist - absent Hand -  absent  Bruising: Wrist - absent Hand -  absent  Deformity: Wrist - absent Hand -  absent    Pain   Hand - The patient exhibits pain of the middle IP and index IP.    Swelling   Hand - The patient is swollen on the middle IP and index IP.    Comments:  Left upper extremity  No drainage to incision at middle finger IP or  flexor tendon middle finger  Mildly tender to palpation at middle finger IP incision  Mildly tender to palpation at index finger  Mild swelling present to middle and index finger  Mild erythema present to middle and index finger  Compartment soft  2+ radial pulses  Normal capillary refill  Sensation intact            Vascular Exam       Capillary Refill  Left Hand: normal capillary refill      Assessment:     Encounter Diagnosis   Name Primary?    Cellulitis of finger of left hand Yes        Plan:     Clindamycin 300 mg Q 6 hr x7 days  Zofran 8mg refilled due to patient complained of nausea associated with clindamycin in the past  Patient to monitor for increasing swelling, increasing redness, drainage, fever, and chills and reports to ED acutely worsened  Continue nonweightbearing status of left upper extremity  Follow-up in 2 days    Patient verbalizes understanding and agrees with the above plan.    Joey Graham PA-C  Orthopedic Surgery/Sports Medicine

## 2019-02-08 ENCOUNTER — OFFICE VISIT (OUTPATIENT)
Dept: ORTHOPEDICS | Facility: CLINIC | Age: 49
End: 2019-02-08
Payer: MEDICAID

## 2019-02-08 VITALS
WEIGHT: 119 LBS | RESPIRATION RATE: 18 BRPM | DIASTOLIC BLOOD PRESSURE: 72 MMHG | BODY MASS INDEX: 19.13 KG/M2 | SYSTOLIC BLOOD PRESSURE: 117 MMHG | HEIGHT: 66 IN | HEART RATE: 64 BPM | TEMPERATURE: 97 F

## 2019-02-08 DIAGNOSIS — Z98.890 STATUS POST INCISION AND DRAINAGE: ICD-10-CM

## 2019-02-08 DIAGNOSIS — L03.012 CELLULITIS OF FINGER OF LEFT HAND: Primary | ICD-10-CM

## 2019-02-08 DIAGNOSIS — Z98.890 POSTOPERATIVE STATE: ICD-10-CM

## 2019-02-08 PROCEDURE — 99213 OFFICE O/P EST LOW 20 MIN: CPT | Mod: PBBFAC,PN | Performed by: PHYSICIAN ASSISTANT

## 2019-02-08 PROCEDURE — 99024 POSTOP FOLLOW-UP VISIT: CPT | Mod: ,,, | Performed by: PHYSICIAN ASSISTANT

## 2019-02-08 PROCEDURE — 99999 PR PBB SHADOW E&M-EST. PATIENT-LVL III: CPT | Mod: PBBFAC,,, | Performed by: PHYSICIAN ASSISTANT

## 2019-02-08 PROCEDURE — 99024 PR POST-OP FOLLOW-UP VISIT: ICD-10-PCS | Mod: ,,, | Performed by: PHYSICIAN ASSISTANT

## 2019-02-08 PROCEDURE — 99999 PR PBB SHADOW E&M-EST. PATIENT-LVL III: ICD-10-PCS | Mod: PBBFAC,,, | Performed by: PHYSICIAN ASSISTANT

## 2019-02-08 NOTE — PROGRESS NOTES
Subjective:     Patient ID: Aniceto Johansen is a 48 y.o. male.    Chief Complaint: No chief complaint on file.    Surgery Date: 01/01/2019      Surgeon(s) and Role:     * Dany Conway MD - Primary     ASSISTANT: Joey ZHU     The use of Joey ZHU  was medically necessary for patient positioning, skin retraction, closure and assistance with this procedure. The procedure could not be performed properly without the use of her as an assistant. There was no qualified resident/fellow available for assistance with this procedure.      Pre-op Diagnosis:  Abscess of left middle finger, flexor tenosynovitis     Post-op Diagnosis: Same     Procedure(s) (LRB):  INCISION AND DRAINAGE (I&D) left middle finger abscess, incision and drainage flexor tenosynovitis, excisional debridement skin, subcutaneous tissue, muscle, fascia       Mr. Johansen is a 49 yo male who presents for 2day follow up of 4 week postoperative visit of I&D of left middle finger abscess and flexor tenosynovitis.  Patient came in with concerns of swelling and pain of his left middle finger and left index finger 2 days ago. Patient did not finish the last 5 days of his antibiotics because his dog ate them. Patient denied fevers, chills, and drainage. Patient was given clindamycin 300 mg Q 6 hr x 7 days due to recent use of minocycline and allergy to both sulfa drugs and penicillins.  Patient was instructed to come for 48 hr follow-up to see if finger was worsening or improving after introduction of clindamycin.     Today, patient states that finger is overall improved.  Swelling and erythema have improved.  Patient denies fever and chills.  Patient denies drainage or purulence of the finger. He has been taking his clindamycin for the last 2 days as prescribed.       Pain   Associated symptoms include joint swelling. Pertinent negatives include no chest pain, fever, numbness or rash.   Swelling   Associated symptoms include joint  swelling. Pertinent negatives include no chest pain, fever, numbness or rash.       No past medical history on file.  Past Surgical History:   Procedure Laterality Date    EXPLORATION, TENDON Left 1/1/2019    Performed [facility] at [facility] [place]    HAND SURGERY Left 01/01/2019    I&D L middle finger    HERNIA REPAIR      INCISION AND DRAINAGE, UPPER EXTREMITY Left 1/1/2019    Performed by [facility] at [facility] [place]    IRRIGATION AND DEBRIDEMENT, UPPER EXTREMITY Left 1/1/2019    Performed by [facility] MD at [facility] [place]     No family history on file.  Social History     Socioeconomic History    Marital status: Single     Spouse name: Not on file    Number of children: Not on file    Years of education: Not on file    Highest education level: Not on file   Social Needs    Financial resource strain: Not on file    Food insecurity - worry: Not on file    Food insecurity - inability: Not on file    Transportation needs - medical: Not on file    Transportation needs - non-medical: Not on file   Occupational History    Not on file   Tobacco Use    Smoking status: Current Every Day Smoker     Packs/day: 1.00     Types: Cigarettes    Smokeless tobacco: Never Used   Substance and Sexual Activity    Alcohol use: No     Frequency: Never    Drug use: No    Sexual activity: Not on file   Other Topics Concern    Not on file   Social History Narrative    Not on file     Medication List with Changes/Refills   Current Medications    CLINDAMYCIN (CLEOCIN) 300 MG CAPSULE    Take 1 capsule (300 mg total) by mouth every 6 (six) hours. for 7 days    ONDANSETRON (ZOFRAN-ODT) 8 MG TBDL    Take 1 tablet (8 mg total) by mouth every 6 (six) hours as needed.    OXYCODONE-ACETAMINOPHEN (PERCOCET) 7.5-325 MG PER TABLET    Take 1 tablet by mouth every 4 (four) hours as needed.     Review of patient's allergies indicates:   Allergen Reactions    Pcn [penicillins] Hives    Sulfa (sulfonamide  antibiotics) Hives     Review of Systems   Constitution: Negative for fever and night sweats.   HENT: Negative for hearing loss.    Eyes: Negative for blurred vision and visual disturbance.   Cardiovascular: Negative for chest pain and leg swelling.   Respiratory: Negative for shortness of breath.    Endocrine: Negative for polyuria.   Hematologic/Lymphatic: Negative for bleeding problem.   Skin: Negative for rash.   Musculoskeletal: Positive for joint pain and joint swelling. Negative for back pain, muscle cramps and muscle weakness.   Gastrointestinal: Negative for melena.   Genitourinary: Negative for hematuria.   Neurological: Negative for loss of balance, numbness and paresthesias.   Psychiatric/Behavioral: Negative for altered mental status.       Objective:   There is no height or weight on file to calculate BMI.  There were no vitals filed for this visit.    General: Aniceto is well-developed, well-nourished, appears stated age, in no acute distress, alert and oriented to time, place and person.       General    Constitutional: He is oriented to person, place, and time. He appears well-developed and well-nourished. No distress.   HENT:   Head: Normocephalic and atraumatic.   Right Ear: External ear normal.   Left Ear: External ear normal.   Nose: Nose normal.   Eyes: EOM are normal. Pupils are equal, round, and reactive to light. Right eye exhibits no discharge. Left eye exhibits no discharge.   Neck: Normal range of motion.   Cardiovascular: Intact distal pulses.    Pulmonary/Chest: Effort normal. No respiratory distress.   Abdominal: Soft.   Neurological: He is alert and oriented to person, place, and time.   Psychiatric: He has a normal mood and affect. His behavior is normal. Judgment and thought content normal.         Left Hand/Wrist Exam     Inspection   Scars: Wrist - absent Hand -  absent  Effusion: Wrist - absent Hand -  absent  Bruising: Wrist - absent Hand -  absent  Deformity: Wrist - absent  Hand -  absent    Pain   Hand - The patient exhibits pain of the middle IP and index IP.    Swelling   Hand - The patient is swollen on the middle IP and index IP.    Comments:  Left upper extremity  No drainage to incision at middle finger IP or flexor tendon middle finger  Improving tenderness to palpation at middle finger IP incision  Improving tenderness to palpation at the ring finger  Improving swelling present to middle and ring finger  Improving erythema present to middle and ring finger  Compartment soft  2+ radial pulses  Normal capillary refill  Sensation intact            Vascular Exam       Capillary Refill  Left Hand: normal capillary refill      Assessment:     Encounter Diagnoses   Name Primary?    Cellulitis of finger of left hand Yes    Status post incision and drainage     Postoperative state         Plan:     Overall, symptoms are improving with clindamycin today  Continue Clindamycin 300 mg Q 6 hr x7 days  Zofran 8mg refilled due to patient complained of nausea associated with clindamycin in the past  Patient to continue monitor for increasing swelling, increasing redness, drainage, fever, and chills and reports to ED acutely worsened  Continue nonweightbearing status of left upper extremity  Follow-up in 1 week for 6 week postoperative visit with Dr. Conway    Patient verbalizes understanding and agrees with the above plan.    Joey Graham PA-C  Orthopedic Surgery/Sports Medicine

## 2019-02-15 ENCOUNTER — OFFICE VISIT (OUTPATIENT)
Dept: ORTHOPEDICS | Facility: CLINIC | Age: 49
End: 2019-02-15
Payer: MEDICAID

## 2019-02-15 VITALS
HEART RATE: 70 BPM | BODY MASS INDEX: 19.13 KG/M2 | WEIGHT: 119 LBS | HEIGHT: 66 IN | SYSTOLIC BLOOD PRESSURE: 117 MMHG | DIASTOLIC BLOOD PRESSURE: 73 MMHG

## 2019-02-15 DIAGNOSIS — M65.9 FLEXOR TENOSYNOVITIS OF FINGER: Primary | ICD-10-CM

## 2019-02-15 PROCEDURE — 99999 PR PBB SHADOW E&M-EST. PATIENT-LVL III: CPT | Mod: PBBFAC,,, | Performed by: ORTHOPAEDIC SURGERY

## 2019-02-15 PROCEDURE — 99024 POSTOP FOLLOW-UP VISIT: CPT | Mod: ,,, | Performed by: ORTHOPAEDIC SURGERY

## 2019-02-15 PROCEDURE — 99213 OFFICE O/P EST LOW 20 MIN: CPT | Mod: PBBFAC,PN | Performed by: ORTHOPAEDIC SURGERY

## 2019-02-15 PROCEDURE — 99999 PR PBB SHADOW E&M-EST. PATIENT-LVL III: ICD-10-PCS | Mod: PBBFAC,,, | Performed by: ORTHOPAEDIC SURGERY

## 2019-02-15 PROCEDURE — 99024 PR POST-OP FOLLOW-UP VISIT: ICD-10-PCS | Mod: ,,, | Performed by: ORTHOPAEDIC SURGERY

## 2019-02-15 NOTE — PROGRESS NOTES
Subjective:     Patient ID: Aniceto Johansen is a 48 y.o. male.    Chief Complaint: Pain, Post-op Evaluation, and Swelling of the Left Hand    Doing well   Surgery Date: 01/01/2019      Surgeon(s) and Role:     * Dany Conway MD - Primary     ASSISTANT: Joey ZHU     The use of Joey ZHU  was medically necessary for patient positioning, skin retraction, closure and assistance with this procedure. The procedure could not be performed properly without the use of her as an assistant. There was no qualified resident/fellow available for assistance with this procedure.      Pre-op Diagnosis:  Abscess of left middle finger, flexor tenosynovitis     Post-op Diagnosis: Same     Procedure(s) (LRB):  INCISION AND DRAINAGE (I&D) left middle finger abscess, incision and drainage flexor tenosynovitis, excisional debridement skin, subcutaneous tissue, muscle, fascia             Pain   Associated symptoms include joint swelling. Pertinent negatives include no chest pain, fever, numbness or rash.   Swelling   Associated symptoms include joint swelling. Pertinent negatives include no chest pain, fever, numbness or rash.   Hand Pain    The pain is present in the left hand. This is a recurrent problem. The current episode started more than 1 month ago. The problem has been gradually improving. The pain is at a severity of 0/10. Associated symptoms include joint swelling. Pertinent negatives include no fever or numbness. The symptoms are aggravated by activity. Physical therapy was not tried.      History reviewed. No pertinent past medical history.  Past Surgical History:   Procedure Laterality Date    EXPLORATION, TENDON Left 1/1/2019    Performed by [facility] at [facility] OR    HAND SURGERY Left 01/01/2019    I&D L middle finger    HERNIA REPAIR      INCISION AND DRAINAGE, UPPER EXTREMITY Left 1/1/2019    Performed by [facility] at [facility] OR    IRRIGATION AND DEBRIDEMENT,  UPPER EXTREMITY Left 1/1/2019    Performed by [facility] at [facility] [place]     History reviewed. No pertinent family history.  Social History     Socioeconomic History    Marital status: Single     Spouse name: Not on file    Number of children: Not on file    Years of education: Not on file    Highest education level: Not on file   Social Needs    Financial resource strain: Not on file    Food insecurity - worry: Not on file    Food insecurity - inability: Not on file    Transportation needs - medical: Not on file    Transportation needs - non-medical: Not on file   Occupational History    Not on file   Tobacco Use    Smoking status: Current Every Day Smoker     Packs/day: 1.00     Types: Cigarettes    Smokeless tobacco: Never Used   Substance and Sexual Activity    Alcohol use: No     Frequency: Never    Drug use: No    Sexual activity: Not on file   Other Topics Concern    Not on file   Social History Narrative    Not on file     Medication List with Changes/Refills   Current Medications    ONDANSETRON (ZOFRAN-ODT) 8 MG TBDL    Take 1 tablet (8 mg total) by mouth every 6 (six) hours as needed.    OXYCODONE-ACETAMINOPHEN (PERCOCET) 7.5-325 MG PER TABLET    Take 1 tablet by mouth every 4 (four) hours as needed.     Review of patient's allergies indicates:   Allergen Reactions    Pcn [penicillins] Hives    Sulfa (sulfonamide antibiotics) Hives     Review of Systems   Constitution: Negative for fever and night sweats.   HENT: Negative for hearing loss.    Eyes: Negative for blurred vision and visual disturbance.   Cardiovascular: Negative for chest pain and leg swelling.   Respiratory: Negative for shortness of breath.    Endocrine: Negative for polyuria.   Hematologic/Lymphatic: Negative for bleeding problem.   Skin: Negative for rash.   Musculoskeletal: Positive for joint pain and joint swelling. Negative for back pain, muscle cramps and muscle weakness.   Gastrointestinal: Negative for  melena.   Genitourinary: Negative for hematuria.   Neurological: Negative for loss of balance, numbness and paresthesias.   Psychiatric/Behavioral: Negative for altered mental status.       Objective:   Body mass index is 19.21 kg/m².  Vitals:    02/15/19 1056   BP: 117/73   Pulse: 70       General: Aniceto is well-developed, well-nourished, appears stated age, in no acute distress, alert and oriented to time, place and person.       General    Constitutional: He is oriented to person, place, and time. He appears well-developed and well-nourished. No distress.   HENT:   Head: Normocephalic and atraumatic.   Right Ear: External ear normal.   Left Ear: External ear normal.   Nose: Nose normal.   Eyes: EOM are normal. Pupils are equal, round, and reactive to light. Right eye exhibits no discharge. Left eye exhibits no discharge.   Neck: Normal range of motion.   Cardiovascular: Intact distal pulses.    Pulmonary/Chest: Effort normal. No respiratory distress.   Abdominal: Soft.   Neurological: He is alert and oriented to person, place, and time.   Psychiatric: He has a normal mood and affect. His behavior is normal. Judgment and thought content normal.         Left Hand/Wrist Exam     Inspection   Scars: Wrist - absent Hand -  absent  Effusion: Wrist - absent Hand -  absent  Bruising: Wrist - absent Hand -  absent  Deformity: Wrist - absent Hand -  absent    Pain   Hand - The patient exhibits pain of the middle IP and index IP.    Swelling   Hand - The patient is swollen on the middle IP and index IP.    Other     Sensory Exam  Median Distribution: normal  Ulnar Distribution: normal  Radial Distribution: normal    Comments:  Incisions well healed  No erythema or drainage            Vascular Exam       Capillary Refill  Left Hand: normal capillary refill      Assessment:     Encounter Diagnosis   Name Primary?    Flexor tenosynovitis of finger Yes        Plan:     Doing well  Declined interest in hand  PT  Demonstrated scar mobilization  Will follow up p.r.n.

## 2019-08-09 ENCOUNTER — HOSPITAL ENCOUNTER (EMERGENCY)
Facility: HOSPITAL | Age: 49
Discharge: HOME | End: 2019-08-09
Attending: EMERGENCY MEDICINE
Payer: MEDICAID

## 2019-08-09 VITALS
HEART RATE: 86 BPM | BODY MASS INDEX: 18.5 KG/M2 | WEIGHT: 114.63 LBS | OXYGEN SATURATION: 98 % | SYSTOLIC BLOOD PRESSURE: 139 MMHG | TEMPERATURE: 98 F | DIASTOLIC BLOOD PRESSURE: 83 MMHG | RESPIRATION RATE: 18 BRPM

## 2019-08-09 DIAGNOSIS — B02.9 HERPES ZOSTER WITHOUT COMPLICATION: Primary | ICD-10-CM

## 2019-08-09 PROCEDURE — 25000003 PHARM REV CODE 250: Performed by: EMERGENCY MEDICINE

## 2019-08-09 PROCEDURE — 99283 EMERGENCY DEPT VISIT LOW MDM: CPT

## 2019-08-09 RX ORDER — VALACYCLOVIR HYDROCHLORIDE 500 MG/1
1000 TABLET, FILM COATED ORAL
Status: COMPLETED | OUTPATIENT
Start: 2019-08-09 | End: 2019-08-09

## 2019-08-09 RX ORDER — VALACYCLOVIR HYDROCHLORIDE 1 G/1
1000 TABLET, FILM COATED ORAL 3 TIMES DAILY
Qty: 21 TABLET | Refills: 0 | Status: SHIPPED | OUTPATIENT
Start: 2019-08-09 | End: 2019-10-05

## 2019-08-09 RX ORDER — HYDROCODONE BITARTRATE AND ACETAMINOPHEN 5; 325 MG/1; MG/1
1 TABLET ORAL EVERY 6 HOURS PRN
Qty: 15 TABLET | Refills: 0 | Status: SHIPPED | OUTPATIENT
Start: 2019-08-09 | End: 2019-10-05

## 2019-08-09 RX ORDER — IBUPROFEN 400 MG/1
400 TABLET ORAL
Status: COMPLETED | OUTPATIENT
Start: 2019-08-09 | End: 2019-08-09

## 2019-08-09 RX ORDER — IBUPROFEN 400 MG/1
400 TABLET ORAL EVERY 6 HOURS PRN
Qty: 30 TABLET | Refills: 0 | Status: SHIPPED | OUTPATIENT
Start: 2019-08-09 | End: 2019-10-05

## 2019-08-09 RX ORDER — ACETAMINOPHEN 325 MG/1
650 TABLET ORAL
Status: COMPLETED | OUTPATIENT
Start: 2019-08-09 | End: 2019-08-09

## 2019-08-09 RX ORDER — HYDROCODONE BITARTRATE AND ACETAMINOPHEN 5; 325 MG/1; MG/1
1 TABLET ORAL
Status: COMPLETED | OUTPATIENT
Start: 2019-08-09 | End: 2019-08-09

## 2019-08-09 RX ADMIN — IBUPROFEN 400 MG: 400 TABLET ORAL at 06:08

## 2019-08-09 RX ADMIN — VALACYCLOVIR HYDROCHLORIDE 1000 MG: 500 TABLET, FILM COATED ORAL at 06:08

## 2019-08-09 RX ADMIN — HYDROCODONE BITARTRATE AND ACETAMINOPHEN 1 TABLET: 5; 325 TABLET ORAL at 06:08

## 2019-08-09 RX ADMIN — ACETAMINOPHEN 650 MG: 325 TABLET ORAL at 06:08

## 2019-08-09 NOTE — ED NOTES
"Patient identifiers verified and correct for Aniceto Johansen.    Pt to ED c/o pulled muscle in back, shingles to right flank.    LOC: The patient is awake, alert and aware of environment with an appropriate affect, the patient is oriented x 3 and speaking appropriately.  Appearance: Pt is resting in stretcher, no acute distress is noted. Clothing and hygiene are appropriate.   Skin: The skin is warm and dry, color consistent with ethnicity, patient has normal skin turgor and moist mucus membranes. Shingles noted to R side of abdomen that goes to mid back. No drainage noted.   Musculoskeletal: Moves all extremities well, full range of motion. No obvious deformities noted. Pt reports "pulled muscle in lower back".   Respiratory: Airway open and patent. Respiration rate even and unlabored. No use of accessory muscles noted.   Cardiac: Patient has a normal rate, no periphreal edema noted, capillary refill < 3 seconds.  Abdomen: No distension noted. Soft and non-tender to palpation.  Neurologic: Symmetrical expression noted in face. Hand grasps equal. Normal sensation reported in all extremities. No obvious neurological deficits noted.     "

## 2019-08-09 NOTE — ED PROVIDER NOTES
SCRIBE #1 NOTE: I, Dennise Blanchard, am scribing for, and in the presence of, Ruddy Garcia MD. I have scribed the entire note.      History      Chief Complaint   Patient presents with    Herpes Zoster     pulled muscle in back, shingles to right flank       Review of patient's allergies indicates:   Allergen Reactions    Pcn [penicillins] Hives    Sulfa (sulfonamide antibiotics) Hives        HPI   HPI    8/9/2019, 6:15 PM   History obtained from the patient      History of Present Illness: Aniceto Johansen is a 49 y.o. male patient who presents to the Emergency Department for R-sided rash which onset gradually about 5 days ago. Symptoms are constant and moderate in severity. No mitigating or exacerbating factors reported. Associated sxs include erythema and pain to the area of the rash, subjective fever x 2 days, and chills. Patient denies any diaphoresis, fatigue, n/v/d, dysuria, wounds, HA, extremity weakness/numbness, and all other sxs at this time. No prior txs reported. No further complaints or concerns at this time.         Arrival mode: Personal vehicle      PCP: Primary Doctor No     Past Medical History:  Past medical history reviewed not relevant      Past Surgical History:  Past Surgical History:   Procedure Laterality Date    EXPLORATION, TENDON Left 1/1/2019    Performed by [facility] Conway MD at [facility] OR    HAND SURGERY Left 01/01/2019    I&D L middle finger    HERNIA REPAIR      INCISION AND DRAINAGE, UPPER EXTREMITY Left 1/1/2019    Performed by D[facility] Conway MD at [facility] OR    IRRIGATION AND DEBRIDEMENT, UPPER EXTREMITY Left 1/1/2019    Performed by Dany Conway MD at [facility] OR           Family History:  Family history reviewed not relevant      Social History:  Social History     Tobacco Use    Smoking status: Current Every Day Smoker     Packs/day: 1.00     Types: Cigarettes    Smokeless tobacco: Never Used   Substance and Sexual Activity    Alcohol use: No      Frequency: Never    Drug use: No    Sexual activity: Unknown       ROS   Review of Systems   Constitutional: Positive for chills and fever (subjective x 2days). Negative for diaphoresis and fatigue.   HENT: Negative for sore throat.    Respiratory: Negative for shortness of breath.    Cardiovascular: Negative for chest pain.   Gastrointestinal: Negative for diarrhea, nausea and vomiting.   Genitourinary: Negative for dysuria.   Musculoskeletal: Positive for myalgias (R- sided secondary to rash ). Negative for back pain.   Skin: Positive for rash (R-sided + erythema). Negative for pallor and wound.   Neurological: Negative for weakness, numbness and headaches.   Hematological: Does not bruise/bleed easily.   All other systems reviewed and are negative.    Physical Exam      Initial Vitals [08/09/19 1757]   BP Pulse Resp Temp SpO2   139/83 86 18 97.8 °F (36.6 °C) 98 %      MAP       --          Physical Exam  Nursing Notes and Vital Signs Reviewed.  Constitutional: Patient is in no acute distress. Well-developed and well-nourished.  Head: Atraumatic. Normocephalic.  Eyes: PERRL. EOM intact. Conjunctivae are not pale. No scleral icterus.  ENT: Mucous membranes are moist. Oropharynx is clear and symmetric.    Neck: Supple. Full ROM. No lymphadenopathy.  Cardiovascular: Regular rate. Regular rhythm. No murmurs, rubs, or gallops. Distal pulses are 2+ and symmetric.  Pulmonary/Chest: No respiratory distress. Clear to auscultation bilaterally. No wheezing or rales.  Abdominal: Soft and non-distended.  There is no tenderness.  No rebound, guarding, or rigidity. Good bowel sounds.  Genitourinary: No CVA tenderness  Musculoskeletal: Moves all extremities. No obvious deformities. No edema. No calf tenderness.  Skin: R-sided thoracic dermotone erythematous rash with clear bolus vesicles. Warm and dry.  Neurological:  Alert, awake, and appropriate.  Normal speech.  No acute focal neurological deficits are  appreciated.  Psychiatric: Normal affect. Good eye contact. Appropriate in content.    ED Course    Procedures  ED Vital Signs:  Vitals:    08/09/19 1757   BP: 139/83   Pulse: 86   Resp: 18   Temp: 97.8 °F (36.6 °C)   TempSrc: Oral   SpO2: 98%   Weight: 52 kg (114 lb 10.2 oz)              The Emergency Provider reviewed the vital signs and test results, which are outlined above.    ED Discussion     6:21 PM: Reassessed pt at this time.   Discussed with pt all pertinent ED information. Discussed pt dx and plan of tx. Gave pt all f/u and return to the ED instructions. All questions and concerns were addressed at this time. Pt expresses understanding of information and instructions, and is comfortable with plan to discharge. Pt is stable for discharge.    I discussed with patient and/or family/caretaker that evaluation in the ED does not suggest any emergent or life threatening medical conditions requiring immediate intervention beyond what was provided in the ED, and I believe patient is safe for discharge.  Regardless, an unremarkable evaluation in the ED does not preclude the development or presence of a serious of life threatening condition. As such, patient was instructed to return immediately for any worsening or change in current symptoms.      ED Medication(s):  Medications   valACYclovir tablet 1,000 mg (1,000 mg Oral Given 8/9/19 1834)   HYDROcodone-acetaminophen 5-325 mg per tablet 1 tablet (1 tablet Oral Given 8/9/19 1834)   acetaminophen tablet 650 mg (650 mg Oral Given 8/9/19 1834)   ibuprofen tablet 400 mg (400 mg Oral Given 8/9/19 1834)       Follow-up Information     Primary Doctor No In 1 week.           [facility]    Specialty:  Emergency Medicine  Why:  As needed, If symptoms worsen  Contact information:  [address]  [facility] [address] [phone]                Discharge Medication List as of 8/9/2019  6:21 PM      START taking these medications     Details   HYDROcodone-acetaminophen (NORCO) 5-325 mg per tablet Take 1 tablet by mouth every 6 (six) hours as needed (for breakthrough pain not relieved by ibuprofen and 650 mg Tylenol)., Starting Fri 8/9/2019, Print      ibuprofen (ADVIL,MOTRIN) 400 MG tablet Take 1 tablet (400 mg total) by mouth every 6 (six) hours as needed (pain)., Starting Fri 8/9/2019, Print      valACYclovir (VALTREX) 1000 MG tablet Take 1 tablet (1,000 mg total) by mouth 3 (three) times daily. for 7 days, Starting Fri 8/9/2019, Until Fri 8/16/2019, Print               Medical Decision Making              Scribe Attestation:   Scribe #1: I performed the above scribed service and the documentation accurately describes the services I performed. I attest to the accuracy of the note.    Attending:   Physician Attestation Statement for Scribe #1: I, Ruddy Garcia MD, personally performed the services described in this documentation, as scribed by Dennise Blanchard, in my presence, and it is both accurate and complete.          Clinical Impression       ICD-10-CM ICD-9-CM   1. Herpes zoster without complication B02.9 053.9       Disposition:   Disposition: Discharged  Condition: Stable         Ruddy Garcia MD  08/10/19 7944

## 2019-08-30 ENCOUNTER — HOSPITAL ENCOUNTER (EMERGENCY)
Facility: HOSPITAL | Age: 49
Discharge: HOME | End: 2019-08-30
Attending: EMERGENCY MEDICINE
Payer: MEDICAID

## 2019-08-30 VITALS
SYSTOLIC BLOOD PRESSURE: 129 MMHG | BODY MASS INDEX: 18.03 KG/M2 | DIASTOLIC BLOOD PRESSURE: 78 MMHG | OXYGEN SATURATION: 98 % | TEMPERATURE: 97 F | RESPIRATION RATE: 20 BRPM | HEART RATE: 66 BPM | WEIGHT: 112.19 LBS | HEIGHT: 66 IN

## 2019-08-30 DIAGNOSIS — L03.114 CELLULITIS OF LEFT HAND: Primary | ICD-10-CM

## 2019-08-30 PROCEDURE — 99283 EMERGENCY DEPT VISIT LOW MDM: CPT

## 2019-08-30 RX ORDER — CLINDAMYCIN HYDROCHLORIDE 150 MG/1
300 CAPSULE ORAL EVERY 8 HOURS
Qty: 42 CAPSULE | Refills: 0 | Status: SHIPPED | OUTPATIENT
Start: 2019-08-30 | End: 2019-09-06

## 2019-10-05 ENCOUNTER — HOSPITAL ENCOUNTER (EMERGENCY)
Facility: HOSPITAL | Age: 49
Discharge: HOME | End: 2019-10-05
Attending: EMERGENCY MEDICINE
Payer: MEDICAID

## 2019-10-05 VITALS
OXYGEN SATURATION: 97 % | SYSTOLIC BLOOD PRESSURE: 120 MMHG | BODY MASS INDEX: 18.94 KG/M2 | RESPIRATION RATE: 16 BRPM | HEART RATE: 80 BPM | HEIGHT: 67 IN | DIASTOLIC BLOOD PRESSURE: 77 MMHG | WEIGHT: 120.69 LBS

## 2019-10-05 DIAGNOSIS — M25.562 ACUTE PAIN OF LEFT KNEE: ICD-10-CM

## 2019-10-05 DIAGNOSIS — L02.416 ABSCESS OF LEFT KNEE: Primary | ICD-10-CM

## 2019-10-05 PROCEDURE — 99283 EMERGENCY DEPT VISIT LOW MDM: CPT | Mod: 25

## 2019-10-05 PROCEDURE — 10061 I&D ABSCESS COMP/MULTIPLE: CPT

## 2019-10-05 PROCEDURE — 25000003 PHARM REV CODE 250: Performed by: EMERGENCY MEDICINE

## 2019-10-05 RX ORDER — CLINDAMYCIN HYDROCHLORIDE 150 MG/1
300 CAPSULE ORAL EVERY 6 HOURS
COMMUNITY
End: 2020-07-11

## 2019-10-05 RX ORDER — CEPHALEXIN 500 MG/1
500 CAPSULE ORAL 4 TIMES DAILY
Qty: 20 CAPSULE | Refills: 0 | Status: SHIPPED | OUTPATIENT
Start: 2019-10-05 | End: 2019-10-10

## 2019-10-05 RX ORDER — LIDOCAINE HYDROCHLORIDE 10 MG/ML
10 INJECTION, SOLUTION EPIDURAL; INFILTRATION; INTRACAUDAL; PERINEURAL
Status: COMPLETED | OUTPATIENT
Start: 2019-10-05 | End: 2019-10-05

## 2019-10-05 RX ORDER — DOXYCYCLINE 100 MG/1
100 CAPSULE ORAL EVERY 12 HOURS
Qty: 20 CAPSULE | Refills: 0 | Status: SHIPPED | OUTPATIENT
Start: 2019-10-05 | End: 2019-10-15

## 2019-10-05 RX ADMIN — LIDOCAINE HYDROCHLORIDE 100 MG: 10 INJECTION, SOLUTION EPIDURAL; INFILTRATION; INTRACAUDAL; PERINEURAL at 06:10

## 2019-10-05 NOTE — ED NOTES
Armband checked, patient verified - Verified by spelling and stated name on armband along with . Pt. C/o pain to L knee and upper leg x 1wk. Pt. Has a raised, pointed bump on L knee that is edematous and erythematous, with red streaks going up leg into groin.

## 2019-10-05 NOTE — ED NOTES
Bandage placed on pt's L knee s/p I/D. DC indicated per MD. DC instructions explained to pt., who verbalized understanding. NAD, VSS, resp. E/u. AAOx4. Ambulatory out of ED with even, steady gait. Copy of DC instructions provided to registration team member to give to patient with checkout. Pt. At registration desk for checkout.

## 2019-10-05 NOTE — ED PROVIDER NOTES
SCRIBE #1 NOTE: IYoung, am scribing for, and in the presence of, Ruddy Garcia MD. I have scribed the entire note.       History     Chief Complaint   Patient presents with    Knee Pain     pt reports infection in his knee all week. now there is a half inch red streak and knot noticed      Review of patient's allergies indicates:   Allergen Reactions    Pcn [penicillins] Hives    Sulfa (sulfonamide antibiotics) Hives         History of Present Illness     HPI    10/5/2019, 5:25 AM  History obtained from the patient      History of Present Illness: Aniceto Johansen is a 49 y.o. male patient who presents to the Emergency Department for evaluation of left knee pain which onset gradually 1 week ago. There is a red streak running up pt's left leg to groin region. Symptoms are constant and moderate in severity. No mitigating or exacerbating factors reported. Associated sxs include abscess to left knee. Patient denies any CP, SOB, abd pain, N/V/D, and all other sxs at this time. No prior Tx reported. No further complaints or concerns at this time.         Arrival mode: Personal vehicle    PCP: [facility]        Past Medical History:  No past medical history on file.    Past Surgical History:  Past Surgical History:   Procedure Laterality Date    EXPLORATION OF TENDON Left 1/1/2019    Procedure: EXPLORATION, TENDON;  Surgeon: Dany Conway MD;  Location: [facility] OR;  Service: Orthopedics;  Laterality: Left;    HAND SURGERY Left 01/01/2019    I&D L middle finger    HERNIA REPAIR      IRRIGATION AND DEBRIDEMENT OF UPPER EXTREMITY Left 1/1/2019    Procedure: IRRIGATION AND DEBRIDEMENT, UPPER EXTREMITY;  Surgeon: Dany Conway MD;  Location: [facility] OR;  Service: Orthopedics;  Laterality: Left;         Family History:  No family history on file.    Social History:  Social History     Tobacco Use    Smoking status: Current Every Day Smoker     Packs/day: 1.00     Types: Cigarettes     Smokeless tobacco: Never Used   Substance and Sexual Activity    Alcohol use: No     Frequency: Never    Drug use: No    Sexual activity: Not on file        Review of Systems     Review of Systems   Constitutional: Positive for chills. Negative for fever.   HENT: Negative for sore throat.    Respiratory: Negative for shortness of breath.    Cardiovascular: Negative for chest pain.   Gastrointestinal: Negative for abdominal pain, diarrhea, nausea and vomiting.   Genitourinary: Negative for dysuria.   Musculoskeletal: Positive for arthralgias (left knee). Negative for back pain.   Skin: Negative for rash.        (+) abscess Left knee   Neurological: Negative for weakness.   Hematological: Does not bruise/bleed easily.        Physical Exam     Initial Vitals [10/05/19 0437]   BP Pulse Resp Temp SpO2   137/81 96 16 -- 97 %      MAP       --          Physical Exam  Nursing Notes and Vital Signs Reviewed.  Constitutional: Patient is in no acute distress. Well-developed and well-nourished.  Head: Atraumatic. Normocephalic.  Eyes: PERRL. EOM intact. Conjunctivae are not pale. No scleral icterus.  ENT: Mucous membranes are moist. Oropharynx is clear and symmetric.    Neck: Supple. Full ROM. No lymphadenopathy.  Cardiovascular: Regular rate. Regular rhythm. No murmurs, rubs, or gallops. Distal pulses are 2+ and symmetric.  Pulmonary/Chest: No respiratory distress. Clear to auscultation bilaterally. No wheezing or rales.  Abdominal: Soft and non-distended.  There is no tenderness.  No rebound, guarding, or rigidity. Good bowel sounds.  Genitourinary: No CVA tenderness  Musculoskeletal: Moves all extremities. No obvious deformities. No edema. No calf tenderness.  Skin: Inguinal lymphadenopathy. Warm and dry.  Neurological:  Alert, awake, and appropriate.  Normal speech.  No acute focal neurological deficits are appreciated.  Psychiatric: Normal affect. Good eye contact. Appropriate in content.  Left Knee:  No obvious  "deformity. There is a 3 cm abscess with fluctuance and redness streaking up. There is swelling.  There is tenderness. DP and PT pulses are 2+.  Normal capillary refill.  Distal sensation is intact.       ED Course   I & D - Incision and Drainage  Date/Time: 10/5/2019 5:50 AM  Performed by: Ruddy Garcia MD  Authorized by: Ruddy Garcia MD   Type: abscess  Body area: lower extremity  Location details: left leg  Anesthesia: local infiltration    Anesthesia:  Local anesthesia used: yes  Local Anesthetic: lidocaine 1% without epinephrine  Anesthetic total: 3 mL  Patient sedated: no  Scalpel size: 11  Incision type: single straight  Complexity: complex  Drainage: purulent  Drainage amount: copious  Wound treatment: wound left open,  incision,  deloculation and  drainage  Patient tolerance: Patient tolerated the procedure well with no immediate complications          ED Vital Signs:  Vitals:    10/05/19 0437 10/05/19 0646   BP: 137/81 120/77   Pulse: 96 80   Resp: 16 16   TempSrc: Oral    SpO2: 97% 97%   Weight: 54.8 kg (120 lb 11.2 oz)    Height: 5' 7" (1.702 m)        Abnormal Lab Results:  Labs Reviewed - No data to display     All Lab Results:  Labs Reviewed - No data to display      Imaging Results:  Imaging Results    None                 The Emergency Provider reviewed the vital signs and test results, which are outlined above.     ED Discussion                    ED Medication(s):  Medications   lidocaine (PF) 10 mg/ml (1%) injection 100 mg (100 mg Infiltration Given 10/5/19 0600)       Discharge Medication List as of 10/5/2019  6:25 AM      START taking these medications    Details   cephALEXin (KEFLEX) 500 MG capsule Take 1 capsule (500 mg total) by mouth 4 (four) times daily. for 5 days, Starting Sat 10/5/2019, Until Thu 10/10/2019, Print      doxycycline (VIBRAMYCIN) 100 MG Cap Take 1 capsule (100 mg total) by mouth every 12 (twelve) hours. for 10 days, Starting Sat 10/5/2019, Until Tue 10/15/2019, " Print                     Scribe Attestation:   Scribe #1: I performed the above scribed service and the documentation accurately describes the services I performed. I attest to the accuracy of the note.     Attending:   Physician Attestation Statement for Scribe #1: I, Ruddy Garcia MD, personally performed the services described in this documentation, as scribed by Young Charles, in my presence, and it is both accurate and complete.           Clinical Impression       ICD-10-CM ICD-9-CM   1. Abscess of left knee L02.416 682.6   2. Acute pain of left knee M25.562 719.46               Ruddy Garcia MD  10/05/19 0756

## 2019-10-23 NOTE — ED PROVIDER NOTES
History      Chief Complaint   Patient presents with    Hand Pain     pt c/o L hand pain and swelling after working on transmission last night       Review of patient's allergies indicates:   Allergen Reactions    Pcn [penicillins] Hives    Sulfa (sulfonamide antibiotics) Hives        HPI   HPI    8/30/2019, 12:18 PM   History obtained from the patient      History of Present Illness: Aniceto Johansen is a 49 y.o. male patient who presents to the Emergency Department for left hand pain and redness, hx of mrsa and concerned it is becoming infected. Denies injury.  Symptoms are moderate in severity.     No further complaints or concerns at this time.           PCP: Primary Doctor No       Past Medical History:  No past medical history on file.      Past Surgical History:  Past Surgical History:   Procedure Laterality Date    EXPLORATION, TENDON Left 1/1/2019    Performed by Dany Conway [facility] at [facility]    HAND SURGERY Left 01/01/2019    I&D L middle finger    HERNIA REPAIR      INCISION AND DRAINAGE, UPPER EXTREMITY Left 1/1/2019    Performed by Dany Conway MD at [facility]    IRRIGATION AND DEBRIDEMENT, UPPER EXTREMITY Left 1/1/2019    Performed by Dany Conway MD at [facility] [place]           Family History:  No family history on file.        Social History:  Social History     Tobacco Use    Smoking status: Current Every Day Smoker     Packs/day: 1.00     Types: Cigarettes    Smokeless tobacco: Never Used   Substance and Sexual Activity    Alcohol use: No     Frequency: Never    Drug use: No    Sexual activity: Not on file       ROS     Review of Systems   Constitutional: Negative for chills and fever.   HENT: Negative for facial swelling and trouble swallowing.    Eyes: Negative for pain and discharge.   Respiratory: Negative for chest tightness and shortness of breath.    Cardiovascular: Negative for palpitations and leg swelling.   Gastrointestinal: Negative for diarrhea and  Order placed for port placement  Thanks!   "vomiting.   Endocrine: Negative for polydipsia and polyuria.   Genitourinary: Negative for decreased urine volume and flank pain.   Musculoskeletal: Negative for joint swelling and neck stiffness.   Skin: Positive for color change. Negative for rash and wound.   Neurological: Negative for syncope and light-headedness.   All other systems reviewed and are negative.      Physical Exam      Initial Vitals [08/30/19 1217]   BP Pulse Resp Temp SpO2   129/78 66 20 97.2 °F (36.2 °C) 98 %      MAP       --         Physical Exam  Vital signs and nursing notes reviewed.  Constitutional: Patient is in NAD. Awake and alert. Well-developed and well-nourished.  Head: Atraumatic. Normocephalic.  Eyes: PERRL. EOM intact. Conjunctivae nl. No scleral icterus.  ENT: Mucous membranes are moist. Oropharynx is clear.  Neck: Supple. No JVD. No lymphadenopathy.  No meningismus  Cardiovascular: Regular rate and rhythm. No murmurs, rubs, or gallops. Distal pulses are 2+ and symmetric.  Pulmonary/Chest: No respiratory distress. Clear to auscultation bilaterally. No wheezing, rales, or rhonchi.  Abdominal: Soft. Non-distended. No TTP. No rebound, guarding, or rigidity. Good bowel sounds.  Genitourinary: No CVA tenderness  Musculoskeletal: Moves all extremities. No edema.   Skin: Warm and dry.  Dorsum of left hand with mild erythema, edema.  FROM, Normal sensation, and cap refill less than 2, to fingers x 5.  Neurological: Awake and alert. No acute focal neurological deficits are appreciated.  Psychiatric: Normal affect. Good eye contact. Appropriate in content.      ED Course          Procedures  ED Vital Signs:  Vitals:    08/30/19 1217   BP: 129/78   Pulse: 66   Resp: 20   Temp: 97.2 °F (36.2 °C)   TempSrc: Oral   SpO2: 98%   Weight: 50.9 kg (112 lb 3.4 oz)   Height: 5' 6" (1.676 m)                 Imaging Results:  Imaging Results    None            The Emergency Provider reviewed the vital signs and test results, which are outlined " above.    ED Discussion             Medication(s) given in the ER:  Medications - No data to display        Follow-up Information     [facility]    Specialty:  Emergency Medicine  Why:  If symptoms worsen  Contact information:  [address] [facility] [address] [phone]           [facility] In 2 days.    Contact information:  [address] [facility] [address] [phone]                          Medication List      START taking these medications    clindamycin 150 MG capsule  Commonly known as:  CLEOCIN  Take 2 capsules (300 mg total) by mouth every 8 (eight) hours. for 7 days        ASK your doctor about these medications    HYDROcodone-acetaminophen 5-325 mg per tablet  Commonly known as:  NORCO  Take 1 tablet by mouth every 6 (six) hours as needed (for breakthrough pain not relieved by ibuprofen and 650 mg Tylenol).     ibuprofen 400 MG tablet  Commonly known as:  ADVIL,MOTRIN  Take 1 tablet (400 mg total) by mouth every 6 (six) hours as needed (pain).     valACYclovir 1000 MG tablet  Commonly known as:  VALTREX  Take 1 tablet (1,000 mg total) by mouth 3 (three) times daily. for 7 days           Where to Get Your Medications      You can get these medications from any pharmacy    Bring a paper prescription for each of these medications  · clindamycin 150 MG capsule             Medical Decision Making        All findings were reviewed with the patient/family in detail.   All remaining questions and concerns were addressed at that time.  Patient/family has been counseled regarding the need for follow-up as well as the indication to return to the emergency room should new or worrisome developments occur.        MDM               Clinical Impression:        ICD-10-CM ICD-9-CM   1. Cellulitis of left hand L03.114 682.4             Kaya Mims PA-C  08/30/19 1221

## 2019-12-22 ENCOUNTER — HOSPITAL ENCOUNTER (EMERGENCY)
Facility: HOSPITAL | Age: 49
Discharge: HOME | End: 2019-12-22
Attending: EMERGENCY MEDICINE
Payer: MEDICAID

## 2019-12-22 VITALS
HEART RATE: 87 BPM | WEIGHT: 116.63 LBS | OXYGEN SATURATION: 99 % | TEMPERATURE: 99 F | HEIGHT: 67 IN | RESPIRATION RATE: 18 BRPM | SYSTOLIC BLOOD PRESSURE: 140 MMHG | BODY MASS INDEX: 18.3 KG/M2 | DIASTOLIC BLOOD PRESSURE: 94 MMHG

## 2019-12-22 DIAGNOSIS — L02.414 ABSCESS OF LEFT ARM: Primary | ICD-10-CM

## 2019-12-22 PROCEDURE — 99284 EMERGENCY DEPT VISIT MOD MDM: CPT

## 2019-12-22 RX ORDER — CLINDAMYCIN HYDROCHLORIDE 300 MG/1
300 CAPSULE ORAL EVERY 6 HOURS
Qty: 28 CAPSULE | Refills: 0 | Status: SHIPPED | OUTPATIENT
Start: 2019-12-22 | End: 2019-12-29

## 2019-12-22 RX ORDER — MUPIROCIN 20 MG/G
OINTMENT TOPICAL 3 TIMES DAILY
Qty: 30 G | Refills: 0 | Status: SHIPPED | OUTPATIENT
Start: 2019-12-22 | End: 2020-10-06 | Stop reason: SDUPTHER

## 2019-12-22 NOTE — ED PROVIDER NOTES
Encounter Date: 12/22/2019       History     Chief Complaint   Patient presents with    Insect Bite     reports felt bug bite his arm while picking up leaves on Thrusday     The history is provided by the patient.   Insect Bite   This is a new problem. The current episode started more than 2 days ago. The problem occurs constantly. The problem has not changed since onset.Pertinent negatives include no chest pain and no shortness of breath.   Patient believes that he was bit by a spider last Thursday. Reports swelling to L wrist, denies any fevers, NVD, CP, SOB or any other symptoms    Review of patient's allergies indicates:   Allergen Reactions    Pcn [penicillins] Hives    Sulfa (sulfonamide antibiotics) Hives     No past medical history on file.  Past Surgical History:   Procedure Laterality Date    EXPLORATION OF TENDON Left 1/1/2019    Procedure: EXPLORATION, TENDON;  Surgeon: Dany Conway MD;  Location: [facility];  Service: Orthopedics;  Laterality: Left;    HAND SURGERY Left 01/01/2019    I&D L middle finger    HERNIA REPAIR      IRRIGATION AND DEBRIDEMENT OF UPPER EXTREMITY Left 1/1/2019    Procedure: IRRIGATION AND DEBRIDEMENT, UPPER EXTREMITY;  Surgeon: Dany Conway MD;  Location: [facility] OR;  Service: Orthopedics;  Laterality: Left;     No family history on file.  Social History     Tobacco Use    Smoking status: Current Every Day Smoker     Packs/day: 1.00     Types: Cigarettes    Smokeless tobacco: Never Used   Substance Use Topics    Alcohol use: No     Frequency: Never    Drug use: No     Review of Systems   Constitutional: Negative for fever.   HENT: Negative for sore throat.    Respiratory: Negative for shortness of breath.    Cardiovascular: Negative for chest pain.   Gastrointestinal: Negative for nausea.   Genitourinary: Negative for dysuria.   Musculoskeletal: Negative for back pain.        + Abscess L wrist   Skin: Negative for rash.   Neurological: Negative for  weakness.   Hematological: Does not bruise/bleed easily.   All other systems reviewed and are negative.      Physical Exam     Initial Vitals [12/22/19 1250]   BP Pulse Resp Temp SpO2   (!) 140/94 87 18 98.5 °F (36.9 °C) 99 %      MAP       --         Physical Exam    Constitutional: He appears well-developed and well-nourished. No distress.   HENT:   Head: Normocephalic and atraumatic.   Nose: Nose normal.   Mouth/Throat: Uvula is midline and oropharynx is clear and moist.   Eyes: Conjunctivae and EOM are normal. Pupils are equal, round, and reactive to light.   Neck: Normal range of motion. Neck supple.   Cardiovascular: Normal rate and regular rhythm.   Pulmonary/Chest: Effort normal and breath sounds normal. No respiratory distress. He has no decreased breath sounds. He has no wheezes. He has no rales.   Abdominal: Soft. Normal appearance and bowel sounds are normal. There is no tenderness.   Musculoskeletal: Normal range of motion.   2 cm abscess L wrist with erythema and induration noted   Neurological: He is alert and oriented to person, place, and time. He has normal strength. GCS eye subscore is 4. GCS verbal subscore is 5. GCS motor subscore is 6.   Skin: Skin is warm and dry. Capillary refill takes less than 2 seconds. No rash noted.   Psychiatric: He has a normal mood and affect. His speech is normal and behavior is normal.         ED Course   Procedures  Labs Reviewed - No data to display       Imaging Results    None           1:09 PM: Pt refusing I&D at this time, requesting ABX and instructed patient to place warm compresses to area 4-5 times per day. RTED if symptoms worsen                               Clinical Impression:       ICD-10-CM ICD-9-CM   1. Abscess of left arm L02.414 682.3                             Adam Zhou Jr., FNP  12/22/19 1310

## 2020-07-11 ENCOUNTER — HOSPITAL ENCOUNTER (EMERGENCY)
Facility: HOSPITAL | Age: 50
Discharge: HOME | End: 2020-07-11
Attending: EMERGENCY MEDICINE
Payer: MEDICAID

## 2020-07-11 VITALS
SYSTOLIC BLOOD PRESSURE: 140 MMHG | BODY MASS INDEX: 18.39 KG/M2 | HEART RATE: 107 BPM | TEMPERATURE: 99 F | WEIGHT: 117.19 LBS | HEIGHT: 67 IN | OXYGEN SATURATION: 97 % | RESPIRATION RATE: 20 BRPM | DIASTOLIC BLOOD PRESSURE: 91 MMHG

## 2020-07-11 DIAGNOSIS — L02.91 ABSCESS: Primary | ICD-10-CM

## 2020-07-11 PROCEDURE — 25000003 PHARM REV CODE 250: Performed by: EMERGENCY MEDICINE

## 2020-07-11 PROCEDURE — 10060 I&D ABSCESS SIMPLE/SINGLE: CPT

## 2020-07-11 PROCEDURE — 99284 EMERGENCY DEPT VISIT MOD MDM: CPT | Mod: 25

## 2020-07-11 RX ORDER — LIDOCAINE HYDROCHLORIDE 10 MG/ML
1 INJECTION, SOLUTION EPIDURAL; INFILTRATION; INTRACAUDAL; PERINEURAL
Status: COMPLETED | OUTPATIENT
Start: 2020-07-11 | End: 2020-07-11

## 2020-07-11 RX ORDER — CLINDAMYCIN HYDROCHLORIDE 150 MG/1
450 CAPSULE ORAL EVERY 8 HOURS
Qty: 45 CAPSULE | Refills: 0 | Status: SHIPPED | OUTPATIENT
Start: 2020-07-11 | End: 2020-07-16

## 2020-07-11 RX ADMIN — LIDOCAINE HYDROCHLORIDE 10 MG: 10 INJECTION, SOLUTION EPIDURAL; INFILTRATION; INTRACAUDAL; PERINEURAL at 08:07

## 2020-07-11 NOTE — ED PROVIDER NOTES
"SCRIBE #1 NOTE: I, Christal Murdock, am scribing for, and in the presence of, Jamil Bae MD. I have scribed the entire note.       History     Chief Complaint   Patient presents with    Abscess     PT states, "I have an abscess on my lower back."     Review of patient's allergies indicates:   Allergen Reactions    Pcn [penicillins] Hives    Sulfa (sulfonamide antibiotics) Hives         History of Present Illness     HPI    7/11/2020, 8:06 AM  History obtained from the patient      History of Present Illness: Aniceto Johansen is a 50 y.o. male patient with a past surgical hx of hernia repair, irrigation and debridement of upper extremity, who presents to the Emergency Department for evaluation of abscess to lower back which onset 2 days ago. Pt reports that he noticed a small superficial cut to the R wrist and bilateral hand pain before he noticed the abscess to his lower back. Symptoms are constant and moderate in severity. No mitigating or exacerbating factors reported. No associated sxs reported. Patient denies any fever, n/v, chills, SOB, and all other sxs at this time. No prior Tx reported. No further complaints or concerns at this time.       Arrival mode: Personal transportation      PCP: [facility]      Past Medical History:  History reviewed. No pertinent past medical history.    Past Surgical History:  Past Surgical History:   Procedure Laterality Date    EXPLORATION OF TENDON Left 1/1/2019    Procedure: EXPLORATION, TENDON;  Surgeon: Dany Conway MD;  Location: [facility] OR;  Service: Orthopedics;  Laterality: Left;    HAND SURGERY Left 01/01/2019    I&D L middle finger    HERNIA REPAIR      IRRIGATION AND DEBRIDEMENT OF UPPER EXTREMITY Left 1/1/2019    Procedure: IRRIGATION AND DEBRIDEMENT, UPPER EXTREMITY;  Surgeon: Dany Conway MD;  Location: [facility] OR;  Service: Orthopedics;  Laterality: Left;         Family History:  History reviewed. No pertinent family " history.    Social History:   Social History     Tobacco Use    Smoking status: Current Every Day Smoker     Packs/day: 1.00     Types: Cigarettes    Smokeless tobacco: Never Used   Substance and Sexual Activity    Alcohol use: No     Frequency: Never    Drug use: No    Sexual activity: Unknown        Review of Systems     Review of Systems   Constitutional: Negative for fever.   HENT: Negative for sore throat.    Respiratory: Negative for shortness of breath.    Cardiovascular: Negative for chest pain.   Gastrointestinal: Negative for diarrhea, nausea and vomiting.   Genitourinary: Negative for dysuria.   Musculoskeletal: Negative for back pain.   Skin: Negative for rash.        (+) abscess to lower back   Neurological: Negative for headaches.   Hematological: Does not bruise/bleed easily.   All other systems reviewed and are negative.       Physical Exam     Initial Vitals [07/11/20 0802]   BP Pulse Resp Temp SpO2   (!) 140/91 107 20 98.7 °F (37.1 °C) 97 %      MAP       --          Physical Exam  Nursing Notes and Vital Signs Reviewed.  Constitutional: Well-developed and well-nourished.   Head: Atraumatic. Normocephalic.  Eyes: EOM intact. No scleral icterus.  ENT: Mucous membranes are moist. Oropharynx is clear and symmetric.    Neck: Supple. Full ROM. No lymphadenopathy.  Cardiovascular: Regular rate. Regular rhythm. No murmurs, rubs, or gallops. Distal pulses are 2+ and symmetric.  Pulmonary/Chest: No respiratory distress. Clear to auscultation bilaterally. No wheezing or rales.  Abdominal: Soft and non-distended.  There is no tenderness.  No rebound, guarding, or rigidity. Good bowel sounds.  Genitourinary: No CVA tenderness  Musculoskeletal: Moves all extremities. No obvious deformities. No calf tenderness.  Skin: 2 cm erythema and fluctuance to L lower back.  Neurological:  Alert, awake, and appropriate.  Normal speech.  No acute focal neurological deficits are appreciated.  Psychiatric: Normal affect.  "Good eye contact. Appropriate in content.     ED Course   I & D - Incision and Drainage    Date/Time: 2020 8:35 AM  Performed by: Jamil Bae MD  Authorized by: Jamil Bae MD   Consent Done: Yes  Consent: Verbal consent obtained.  Risks and benefits: risks, benefits and alternatives were discussed  Consent given by: patient  Patient understanding: patient states understanding of the procedure being performed  Patient consent: the patient's understanding of the procedure matches consent given  Required items: required blood products, implants, devices, and special equipment available  Patient identity confirmed: , name, verbally with patient and MRN  Time out: Immediately prior to procedure a "time out" was called to verify the correct patient, procedure, equipment, support staff and site/side marked as required.  Type: abscess  Body area: trunk  Location details: back  Anesthesia: local infiltration    Anesthesia:  Local Anesthetic: lidocaine 1% without epinephrine  Anesthetic total: 1 mL  Patient sedated: no  Risk factor: underlying major vessel and  underlying major nerve  Scalpel size: 11  Incision type: single straight  Complexity: simple  Drainage: purulent  Drainage amount: scant  Wound treatment: incision,  drainage and  wound packed  Packing material: 1/4 in gauze  Complications: No  Specimens: No  Implants: No  Patient tolerance: Patient tolerated the procedure well with no immediate complications        ED Vital Signs:  Vitals:    20 0802   BP: (!) 140/91   Pulse: 107   Resp: 20   Temp: 98.7 °F (37.1 °C)   TempSrc: Oral   SpO2: 97%   Weight: 53.1 kg (117 lb 2.8 oz)   Height: 5' 7" (1.702 m)       Abnormal Lab Results:  Labs Reviewed - No data to display         The Emergency Provider reviewed the vital signs and test results, which are outlined above.     ED Discussion       8:37 AM: Reassessed pt at this time.  Pt states his condition has improved at this time. Discussed with " pt all pertinent ED information and results. Discussed pt dx and plan of tx. Gave pt all f/u and return to the ED instructions. All questions and concerns were addressed at this time. Pt expresses understanding of information and instructions, and is comfortable with plan to discharge. Pt is stable for discharge.    I discussed with patient and/or family/caretaker that evaluation in the ED does not suggest any emergent or life threatening medical conditions requiring immediate intervention beyond what was provided in the ED, and I believe patient is safe for discharge.  Regardless, an unremarkable evaluation in the ED does not preclude the development or presence of a serious of life threatening condition. As such, patient was instructed to return immediately for any worsening or change in current symptoms.       MDM            Additional MDM:   Smoking Cessation: The patient is a smoker. The patient was counseled on smoking cessation for: 3 minutes. The patient was counseled on tobacco related  health complications.        ED Medication(s):  Medications   lidocaine (PF) 10 mg/ml (1%) injection 10 mg (10 mg Other Given 7/11/20 0822)       New Prescriptions    CLINDAMYCIN (CLEOCIN) 150 MG CAPSULE    Take 3 capsules (450 mg total) by mouth every 8 (eight) hours. for 5 days       Follow-up Information     [facility].    Specialty: General Practice  Contact information:  [id number]  [facility] [address] [phone]                       Scribe Attestation:   Scribe #1: I performed the above scribed service and the documentation accurately describes the services I performed. I attest to the accuracy of the note.     Attending:   Physician Attestation Statement for Scribe #1: I, Jamli Bae MD, personally performed the services described in this documentation, as scribed by Christal Murdock, in my presence, and it is both accurate and complete.           Clinical Impression       ICD-10-CM ICD-9-CM   1.  Abscess  L02.91 682.9       Disposition:   Disposition: Discharged  Condition: Stable         Jamil Bae MD  07/11/20 0901

## 2020-07-16 ENCOUNTER — HOSPITAL ENCOUNTER (EMERGENCY)
Facility: HOSPITAL | Age: 50
Discharge: LEFT BEFORE DISCHARGE | End: 2020-07-16
Attending: EMERGENCY MEDICINE
Payer: MEDICAID

## 2020-07-16 ENCOUNTER — NURSE TRIAGE (OUTPATIENT)
Dept: ADMINISTRATIVE | Facility: CLINIC | Age: 50
End: 2020-07-16

## 2020-07-16 VITALS
DIASTOLIC BLOOD PRESSURE: 84 MMHG | HEART RATE: 80 BPM | BODY MASS INDEX: 17.82 KG/M2 | OXYGEN SATURATION: 98 % | WEIGHT: 113.75 LBS | RESPIRATION RATE: 16 BRPM | SYSTOLIC BLOOD PRESSURE: 140 MMHG | TEMPERATURE: 100 F

## 2020-07-16 DIAGNOSIS — L02.91 ABSCESS: Primary | ICD-10-CM

## 2020-07-16 PROCEDURE — 99284 EMERGENCY DEPT VISIT MOD MDM: CPT

## 2020-07-16 RX ORDER — PROMETHAZINE HYDROCHLORIDE 25 MG/1
25 TABLET ORAL EVERY 6 HOURS PRN
Qty: 15 TABLET | Refills: 0 | Status: SHIPPED | OUTPATIENT
Start: 2020-07-16

## 2020-07-16 RX ORDER — DOXYCYCLINE 100 MG/1
100 CAPSULE ORAL 2 TIMES DAILY
Qty: 20 CAPSULE | Refills: 0 | Status: SHIPPED | OUTPATIENT
Start: 2020-07-16 | End: 2020-07-26

## 2020-07-16 RX ORDER — CEPHALEXIN 500 MG/1
500 CAPSULE ORAL 4 TIMES DAILY
Qty: 20 CAPSULE | Refills: 0 | Status: SHIPPED | OUTPATIENT
Start: 2020-07-16 | End: 2020-07-21

## 2020-07-16 NOTE — TELEPHONE ENCOUNTER
Spoke with patient he states that he believes the wound on his tailbone is infected. States that wound was cut and drained 5 days ago.  Reports that he took clindamycin for 5 days and incision continues to look infected.  Patient states that wound is a half a dollar sive and has green discharge coming from incision.  Patient also states that incision is tender to touch.  Current temp 98.3.  Advised patient to go to ER for further evaluation.  Patient verbalized understanding.     Reason for Disposition   [1] Widespread rash AND [2] bright red, sunburn-like    Additional Information   Negative: [1] Widespread rash AND [2] bright red, sunburn-like AND [3] too weak to stand   Negative: Sounds like a life-threatening emergency to the triager    Protocols used: ST WOUND INFECTION-A-AH

## 2020-07-17 NOTE — ED PROVIDER NOTES
Encounter Date: 7/16/2020       History     Chief Complaint   Patient presents with    Abscess     seen here this weekend for abscess to back, completed antibiotics but still has pain     The history is provided by the patient.   Abscess   This is a recurrent problem. The current episode started several days ago. The problem occurs continuously. The problem has been unchanged. The abscess is present on the back. The abscess is characterized by redness, painfulness and swelling. Pertinent negatives include no fever and no sore throat.   Pt reports that his abscess on back hasnt improved and he is almost done with abx. Denies any fevers    Review of patient's allergies indicates:   Allergen Reactions    Pcn [penicillins] Hives    Sulfa (sulfonamide antibiotics) Hives     No past medical history on file.  Past Surgical History:   Procedure Laterality Date    EXPLORATION OF TENDON Left 1/1/2019    Procedure: EXPLORATION, TENDON;  Surgeon: Dany Conway MD;  Location: [facility];  Service: Orthopedics;  Laterality: Left;    HAND SURGERY Left 01/01/2019    I&D L middle finger    HERNIA REPAIR      IRRIGATION AND DEBRIDEMENT OF UPPER EXTREMITY Left 1/1/2019    Procedure: IRRIGATION AND DEBRIDEMENT, UPPER EXTREMITY;  Surgeon: Dany Conway MD;  Location: [facility] OR;  Service: Orthopedics;  Laterality: Left;     No family history on file.  Social History     Tobacco Use    Smoking status: Current Every Day Smoker     Packs/day: 1.00     Types: Cigarettes    Smokeless tobacco: Never Used   Substance Use Topics    Alcohol use: No     Frequency: Never    Drug use: No     Review of Systems   Constitutional: Negative for fever.   HENT: Negative for sore throat.    Respiratory: Negative for shortness of breath.    Cardiovascular: Negative for chest pain.   Gastrointestinal: Negative for nausea.   Genitourinary: Negative for dysuria.   Musculoskeletal: Negative for back pain.   Skin: Negative for rash.        +  Abscess lower back     Neurological: Negative for weakness.   Hematological: Does not bruise/bleed easily.   All other systems reviewed and are negative.      Physical Exam     Initial Vitals [07/16/20 1930]   BP Pulse Resp Temp SpO2   (!) 140/84 80 16 99.7 °F (37.6 °C) 98 %      MAP       --         Physical Exam    Constitutional: He appears well-developed and well-nourished. No distress.   HENT:   Head: Normocephalic and atraumatic.   Nose: Nose normal.   Mouth/Throat: Uvula is midline and oropharynx is clear and moist.   Eyes: Conjunctivae and EOM are normal. Pupils are equal, round, and reactive to light.   Neck: Normal range of motion. Neck supple.   Cardiovascular: Normal rate and regular rhythm.   Pulmonary/Chest: Effort normal and breath sounds normal. No respiratory distress. He has no decreased breath sounds. He has no wheezes. He has no rales.   Abdominal: Soft. Normal appearance and bowel sounds are normal. There is no abdominal tenderness.   Musculoskeletal: Normal range of motion.   Neurological: He is alert and oriented to person, place, and time. He has normal strength. GCS eye subscore is 4. GCS verbal subscore is 5. GCS motor subscore is 6.   Skin: Skin is warm and dry. Capillary refill takes less than 2 seconds. No rash noted.        2 cm abscess L lower back, drainage noted, no fluctuance   Psychiatric: He has a normal mood and affect. His speech is normal and behavior is normal.         ED Course   Procedures  Labs Reviewed - No data to display       Imaging Results    None        8:10 PM: Pt declined another I&D, he states that when he showers he can get abscess to drain. Pt is requesting a different abx at this time    I discussed with patient and/or family/caretaker that evaluation in the ED does not suggest any emergent or life threatening medical conditions requiring immediate intervention beyond what was provided in the ED, and I believe patient is safe for discharge.  Regardless, an  unremarkable evaluation in the ED does not preclude the development or presence of a serious of life threatening condition. As such, patient was instructed to return immediately for any worsening or change in current symptoms.                                    Clinical Impression:       ICD-10-CM ICD-9-CM   1. Abscess  L02.91 682.9             ED Disposition Condition    Discharge Stable        ED Prescriptions     Medication Sig Dispense Start Date End Date Auth. Provider    cephALEXin (KEFLEX) 500 MG capsule Take 1 capsule (500 mg total) by mouth 4 (four) times daily. for 5 days 20 capsule 7/16/2020 7/21/2020 SUSHANT Hobson Jr.    doxycycline (VIBRAMYCIN) 100 MG Cap Take 1 capsule (100 mg total) by mouth 2 (two) times daily. for 10 days 20 capsule 7/16/2020 7/26/2020 SUSHANT Hobson Jr.    promethazine (PHENERGAN) 25 MG tablet Take 1 tablet (25 mg total) by mouth every 6 (six) hours as needed for Nausea. 15 tablet 7/16/2020  SUSHANT Hobson Jr.        Follow-up Information    None                                    SUSHANT Hobson Jr.  07/16/20 2011

## 2020-09-03 ENCOUNTER — NURSE TRIAGE (OUTPATIENT)
Dept: ADMINISTRATIVE | Facility: CLINIC | Age: 50
End: 2020-09-03

## 2020-09-03 NOTE — TELEPHONE ENCOUNTER
"Last may got a scratch on face and two days later face was hurting so bad went to [facility] ED. Says he has a bacterial infection infection in face. 7 incisions were done in nose/mouth but infection was too deep and medications were given to "slow it down". Was on many antibiotics for this issue. Yesterday , cheek in same spot around cheek and top lip are painful and today that whole area is painful. Recommended to be seen today within 4 hours per protocol and he is agreeable to plan. Discussed getting a pcp and also inquiring about a specialist while he is at ED because he said every single cut he gets (even a splinter) will become severely infected. Advised that he inquire about a referral for an infectious disease doctor and he agrees to plan.  Reason for Disposition   [1] SEVERE mouth pain (e.g., excruciating) AND [2] not improved after 2 hours of pain medicine    Additional Information   Negative: Severe difficulty breathing (e.g., struggling for each breath, speaks in single words, stridor)   Negative: Sounds like a life-threatening emergency to the triager   Negative: Followed a tooth (or teeth) injury   Negative: Followed a mouth injury   Negative: Throat is painful   Negative: Canker sore suspected (i.e., aphthous ulcer) in the mouth   Negative: Cold sore suspected (i.e., fever blister sore) on the outer lip   Negative: Tooth is painful or swelling around a tooth   Negative: [1] Drooling or spitting out saliva (because can't swallow) AND [2] new onset   Negative: Electrical burn of mouth   Negative: Chemical burn of mouth   Negative: Tongue is very swollen and tender   Negative: [1] Difficulty breathing AND [2] not severe   Negative: [1] Face is swollen AND [2] fever   Negative: [1] Drinking very little AND [2] dehydration suspected (e.g., no urine > 12 hours, very dry mouth, very lightheaded)   Negative: Patient sounds very sick or weak to the triager    Protocols used: MOUTH PAIN-A-AH      "

## 2020-09-17 ENCOUNTER — HOSPITAL ENCOUNTER (EMERGENCY)
Facility: HOSPITAL | Age: 50
Discharge: HOME | End: 2020-09-17
Attending: EMERGENCY MEDICINE
Payer: MEDICAID

## 2020-09-17 VITALS
RESPIRATION RATE: 20 BRPM | TEMPERATURE: 99 F | SYSTOLIC BLOOD PRESSURE: 119 MMHG | HEART RATE: 95 BPM | DIASTOLIC BLOOD PRESSURE: 84 MMHG | BODY MASS INDEX: 17.49 KG/M2 | OXYGEN SATURATION: 95 % | WEIGHT: 111.69 LBS

## 2020-09-17 DIAGNOSIS — K40.90 LEFT INGUINAL HERNIA: Primary | ICD-10-CM

## 2020-09-17 PROCEDURE — 99282 EMERGENCY DEPT VISIT SF MDM: CPT

## 2020-09-17 NOTE — ED PROVIDER NOTES
Encounter Date: 9/17/2020       History     Chief Complaint   Patient presents with    Abdominal Pain     reports hernias to L ad R lower abd; painful with activity     The history is provided by the patient.   Abdominal Pain  The current episode started unknown. The onset of the illness was gradual. The other symptoms of the illness do not include fever, shortness of breath, nausea or dysuria.   Symptoms associated with the illness do not include back pain.   L inguinal hernia that is painful when walking or with certain positions. Denies any NV, worsening pain or fevers.     Review of patient's allergies indicates:   Allergen Reactions    Pcn [penicillins] Hives    Sulfa (sulfonamide antibiotics) Hives     No past medical history on file.  Past Surgical History:   Procedure Laterality Date    EXPLORATION OF TENDON Left 1/1/2019    Procedure: EXPLORATION, TENDON;  Surgeon: Dany Conway MD;  Location: [facility] OR;  Service: Orthopedics;  Laterality: Left;    HAND SURGERY Left 01/01/2019    I&D L middle finger    HERNIA REPAIR      IRRIGATION AND DEBRIDEMENT OF UPPER EXTREMITY Left 1/1/2019    Procedure: IRRIGATION AND DEBRIDEMENT, UPPER EXTREMITY;  Surgeon: Dany Conway MD;  Location: [facility] OR;  Service: Orthopedics;  Laterality: Left;     No family history on file.  Social History     Tobacco Use    Smoking status: Current Every Day Smoker     Packs/day: 1.00     Types: Cigarettes    Smokeless tobacco: Never Used   Substance Use Topics    Alcohol use: No     Frequency: Never    Drug use: No     Review of Systems   Constitutional: Negative for fever.   HENT: Negative for sore throat.    Respiratory: Negative for shortness of breath.    Cardiovascular: Negative for chest pain.   Gastrointestinal: Positive for abdominal pain. Negative for nausea.        + L inguinal hernia   Genitourinary: Negative for dysuria.   Musculoskeletal: Negative for back pain.   Skin: Negative for rash.    Neurological: Negative for weakness.   Hematological: Does not bruise/bleed easily.   All other systems reviewed and are negative.      Physical Exam     Initial Vitals [09/17/20 1404]   BP Pulse Resp Temp SpO2   119/84 95 20 98.8 °F (37.1 °C) 95 %      MAP       --         Physical Exam    Constitutional: He appears well-developed and well-nourished. No distress.   HENT:   Head: Normocephalic and atraumatic.   Nose: Nose normal.   Mouth/Throat: Uvula is midline and oropharynx is clear and moist.   Eyes: Conjunctivae and EOM are normal. Pupils are equal, round, and reactive to light.   Neck: Normal range of motion. Neck supple.   Cardiovascular: Normal rate and regular rhythm.   Pulmonary/Chest: Effort normal and breath sounds normal. No respiratory distress. He has no decreased breath sounds. He has no wheezes. He has no rales.   Abdominal: Soft. Normal appearance and bowel sounds are normal. There is no abdominal tenderness.   Soft L inguinal hernia, easily reducible    Musculoskeletal: Normal range of motion.   Neurological: He is alert and oriented to person, place, and time. He has normal strength. GCS eye subscore is 4. GCS verbal subscore is 5. GCS motor subscore is 6.   Skin: Skin is warm and dry. Capillary refill takes less than 2 seconds. No rash noted.   Psychiatric: He has a normal mood and affect. His speech is normal and behavior is normal.         ED Course   Procedures  Labs Reviewed - No data to display       Imaging Results    None            I discussed with patient and/or family/caretaker that evaluation in the ED does not suggest any emergent or life threatening medical conditions requiring immediate intervention beyond what was provided in the ED, and I believe patient is safe for discharge.  Regardless, an unremarkable evaluation in the ED does not preclude the development or presence of a serious of life threatening condition. As such, patient was instructed to return immediately for any  worsening or change in current symptoms.                             Clinical Impression:       ICD-10-CM ICD-9-CM   1. Left inguinal hernia  K40.90 550.90                          ED Disposition Condition    Discharge Stable        ED Prescriptions     None        Follow-up Information     Follow up With Specialties Details Why Contact Info    [facility] Schedule an appointment as soon as possible for a visit   [address] [facility]  [place] [address]  [phone]                                         Adam Zhou Jr., FNP  09/17/20 1421

## 2020-10-06 ENCOUNTER — HOSPITAL ENCOUNTER (EMERGENCY)
Facility: HOSPITAL | Age: 50
Discharge: HOME | End: 2020-10-06
Attending: EMERGENCY MEDICINE
Payer: MEDICAID

## 2020-10-06 VITALS
TEMPERATURE: 99 F | WEIGHT: 111.88 LBS | HEART RATE: 83 BPM | RESPIRATION RATE: 18 BRPM | DIASTOLIC BLOOD PRESSURE: 78 MMHG | HEIGHT: 67 IN | OXYGEN SATURATION: 97 % | BODY MASS INDEX: 17.56 KG/M2 | SYSTOLIC BLOOD PRESSURE: 110 MMHG
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
## 2020-10-06 DIAGNOSIS — Z72.0 TOBACCO ABUSE DISORDER: ICD-10-CM

## 2020-10-06 DIAGNOSIS — S60.461A INSECT BITE OF LEFT INDEX FINGER, INITIAL ENCOUNTER: Primary | ICD-10-CM

## 2020-10-06 PROCEDURE — 99283 EMERGENCY DEPT VISIT LOW MDM: CPT

## 2020-10-06 RX ORDER — MUPIROCIN 20 MG/G
OINTMENT TOPICAL 3 TIMES DAILY
Qty: 30 G | Refills: 0 | Status: SHIPPED | OUTPATIENT
Start: 2020-10-06

## 2020-10-06 NOTE — ED PROVIDER NOTES
SCRIBE #1 NOTE: I, Christal Murdock, am scribing for, and in the presence of, Dona Knott MD. I have scribed the entire note.       History     Chief Complaint   Patient presents with    lymph node problems     sent to [facility] 2 wks ago for hernia sx/ was told that lymph nodes swollen in groin, now spreading. c/o pain to R&L side, pain in wrist, and neck     Review of patient's allergies indicates:   Allergen Reactions    Pcn [penicillins] Hives    Sulfa (sulfonamide antibiotics) Hives         History of Present Illness     HPI    10/6/2020, 1:20 PM  History obtained from the patient      History of Present Illness: Aniceto Johansen is a 50 y.o. male patient who presents to the Emergency Department for evaluation of L index finger pain which onset yesterday. While the chief complaint by nursing assessment was documented as lymph node problems, the patient indicates their chief complaint during my interview as L index finger pain. Pt reports that when he went for his colonoscopy pre-op yesterday, he was told to come to the ED to get his finger checked out. Pt is scheduled for a colonoscopy on 10/8/20. Symptoms are constant and moderate in severity. Pt's sxs are exacerbated with extension and flexion of L index finger. No associated sxs reported. Patient denies any fever, chills, n/v/d, SOB, CP, and all other sxs at this time. No prior Tx reported. No further complaints or concerns at this time.       Arrival mode: Personal transportation      PCP: [facility]      Past Medical History:  History reviewed. No pertinent past medica history.       Past Surgical History:  Past Surgical History:   Procedure Laterality Date    EXPLORATION OF TENDON Left 1/1/2019    Procedure: EXPLORATION, TENDON;  Surgeon: Dany Conway MD;  Location: [facility];  Service: Orthopedics;  Laterality: Left;    HAND SURGERY Left 01/01/2019    I&D L middle finger    HERNIA REPAIR      IRRIGATION AND DEBRIDEMENT OF UPPER  EXTREMITY Left 1/1/2019    Procedure: IRRIGATION AND DEBRIDEMENT, UPPER EXTREMITY;  Surgeon: Dany Conway MD;  Location: [facility] OR;  Service: Orthopedics;  Laterality: Left;         Family History:  History reviewed. No pertinent family history.      Social History:   Social History     Tobacco Use    Smoking status: Current Every Day Smoker     Packs/day: 1.00     Types: Cigarettes    Smokeless tobacco: Never Used   Substance and Sexual Activity    Alcohol use: No     Frequency: Never    Drug use: No    Sexual activity: Unknown        Review of Systems     Review of Systems   Constitutional: Negative for chills and fever.   HENT: Negative for sore throat.    Respiratory: Negative for cough and shortness of breath.    Cardiovascular: Negative for chest pain.   Gastrointestinal: Negative for diarrhea, nausea and vomiting.   Genitourinary: Negative for dysuria.   Musculoskeletal: Negative for back pain.        (+) L index finger pain   Skin: Negative for rash.   Neurological: Negative for weakness and headaches.   Hematological: Does not bruise/bleed easily.   All other systems reviewed and are negative.       Physical Exam     Initial Vitals [10/06/20 1314]   BP Pulse Resp Temp SpO2   110/78 83 18 98.5 °F (36.9 °C) 97 %      MAP       --          Physical Exam  Nursing Notes and Vital Signs Reviewed.  Constitutional: Well-developed and well-nourished.   Head: Atraumatic. Normocephalic.  Eyes: EOM intact. No scleral icterus.  ENT: Mucous membranes are moist. Oropharynx is clear and symmetric.    Neck: Supple. Full ROM. No lymphadenopathy.  Cardiovascular: Regular rate. Regular rhythm. No murmurs, rubs, or gallops. Distal pulses are 2+ and symmetric.  Pulmonary/Chest: No respiratory distress. Clear to auscultation bilaterally. No wheezing or rales.  Abdominal: Soft and non-distended.  There is no tenderness.  No rebound, guarding, or rigidity. No palpable masses or hernias.   Genitourinary: No CVA  "tenderness  Musculoskeletal: Moves all extremities. No obvious deformities.   Left Hand: No obvious bony deformity. Small area of swelling with fullness to left mid index finger (likely insect bite)  No erythema or induration to suggest cellulitis. Clubbing of fingers noted. Radial, median, and ulnar nerves are intact. Radial and ulnar pulses are 2+. Normal capillary refill.  Distal sensation is intact. NVI distally.   Skin: Warm and dry. No rash.   Neurological:  Alert, awake, and appropriate.  Normal speech.  No acute focal neurological deficits are appreciated.  Psychiatric: Normal affect. Good eye contact. Appropriate in content.     ED Course   Procedures  ED Vital Signs:  Vitals:    10/06/20 1314   BP: 110/78   Pulse: 83   Resp: 18   Temp: 98.5 °F (36.9 °C)   TempSrc: Oral   SpO2: 97%   Weight: 50.8 kg (111 lb 14.1 oz)   Height: 5' 7" (1.702 m)       Abnormal Lab Results:  Labs Reviewed - No data to display         The Emergency Provider reviewed the vital signs and test results, which are outlined above.     ED Discussion       1:35 PM: Reassessed pt at this time.  Pt states his condition has improved at this time. Discussed with pt all pertinent ED information and results. Discussed pt dx and plan of tx. Gave pt all f/u and return to the ED instructions. All questions and concerns were addressed at this time. Pt expresses understanding of information and instructions, and is comfortable with plan to discharge. Pt is stable for discharge.    I discussed with patient and/or family/caretaker that evaluation in the ED does not suggest any emergent or life threatening medical conditions requiring immediate intervention beyond what was provided in the ED, and I believe patient is safe for discharge.  Regardless, an unremarkable evaluation in the ED does not preclude the development or presence of a serious of life threatening condition. As such, patient was instructed to return immediately for any worsening or " change in current symptoms.       MDM            Additional MDM:   Smoking Cessation: The patient is a smoker. The patient was counseled on smoking cessation for: 3 minutes. The patient was counseled on tobacco related  health complications.        ED Medication(s):  Medications - No data to display    Discharge Medication List as of 10/6/2020  1:28 PM          Follow-up Information     [facility]. Schedule an appointment as soon as possible for a visit in 1 day.    Specialty: General Practice  Why: Return to the Emergency Room, If symptoms worsen  Contact information:  [address] [address] [address]  [phone]                       Scribe Attestation:   Scribe #1: I performed the above scribed service and the documentation accurately describes the services I performed. I attest to the accuracy of the note.     Attending:   Physician Attestation Statement for Scribe #1: I, Dona Knott MD, personally performed the services described in this documentation, as scribed by Christal Murdock, in my presence, and it is both accurate and complete.           Clinical Impression       ICD-10-CM ICD-9-CM   1. Insect bite of left index finger, initial encounter  S60.461A 915.4    W57.XXXA E906.4   2. Tobacco abuse disorder  Z72.0 305.1       Disposition:   Disposition: Discharged  Condition: Stable         Dona Knott MD  10/06/20 [id number]

## 2020-10-09 ENCOUNTER — HOSPITAL ENCOUNTER (EMERGENCY)
Facility: HOSPITAL | Age: 50
Discharge: HOME | End: 2020-10-09
Attending: EMERGENCY MEDICINE
Payer: MEDICAID

## 2020-10-09 VITALS
HEART RATE: 75 BPM | OXYGEN SATURATION: 99 % | WEIGHT: 114.44 LBS | TEMPERATURE: 98 F | RESPIRATION RATE: 18 BRPM | BODY MASS INDEX: 17.96 KG/M2 | DIASTOLIC BLOOD PRESSURE: 97 MMHG | HEIGHT: 67 IN | SYSTOLIC BLOOD PRESSURE: 140 MMHG

## 2020-10-09 DIAGNOSIS — L02.512 ABSCESS OF LEFT INDEX FINGER: Primary | ICD-10-CM

## 2020-10-09 LAB
ALBUMIN SERPL BCP-MCNC: 4.1 G/DL (ref 3.5–5.2)
ALP SERPL-CCNC: 69 U/L (ref 55–135)
ALT SERPL W/O P-5'-P-CCNC: 17 U/L (ref 10–44)
ANION GAP SERPL CALC-SCNC: 10 MMOL/L (ref 8–16)
AST SERPL-CCNC: 17 U/L (ref 10–40)
BASOPHILS # BLD AUTO: 0.04 K/UL (ref 0–0.2)
BASOPHILS NFR BLD: 0.4 % (ref 0–1.9)
BILIRUB SERPL-MCNC: 0.2 MG/DL (ref 0.1–1)
BUN SERPL-MCNC: 14 MG/DL (ref 6–20)
CALCIUM SERPL-MCNC: 9.4 MG/DL (ref 8.7–10.5)
CHLORIDE SERPL-SCNC: 101 MMOL/L (ref 95–110)
CO2 SERPL-SCNC: 28 MMOL/L (ref 23–29)
CREAT SERPL-MCNC: 0.9 MG/DL (ref 0.5–1.4)
CRP SERPL-MCNC: 3.6 MG/L (ref 0–8.2)
DIFFERENTIAL METHOD: ABNORMAL
EOSINOPHIL # BLD AUTO: 0.8 K/UL (ref 0–0.5)
EOSINOPHIL NFR BLD: 8 % (ref 0–8)
ERYTHROCYTE [DISTWIDTH] IN BLOOD BY AUTOMATED COUNT: 12.3 % (ref 11.5–14.5)
ERYTHROCYTE [SEDIMENTATION RATE] IN BLOOD BY WESTERGREN METHOD: 32 MM/HR (ref 0–10)
EST. GFR  (AFRICAN AMERICAN): >60 ML/MIN/1.73 M^2
EST. GFR  (NON AFRICAN AMERICAN): >60 ML/MIN/1.73 M^2
GLUCOSE SERPL-MCNC: 87 MG/DL (ref 70–110)
HCT VFR BLD AUTO: 46.1 % (ref 40–54)
HCV AB SERPL QL IA: NEGATIVE
HGB BLD-MCNC: 15.3 G/DL (ref 14–18)
HIV 1+2 AB+HIV1 P24 AG SERPL QL IA: NEGATIVE
IMM GRANULOCYTES # BLD AUTO: 0.03 K/UL (ref 0–0.04)
IMM GRANULOCYTES NFR BLD AUTO: 0.3 % (ref 0–0.5)
LACTATE SERPL-SCNC: 1.3 MMOL/L (ref 0.5–2.2)
LYMPHOCYTES # BLD AUTO: 1.5 K/UL (ref 1–4.8)
LYMPHOCYTES NFR BLD: 15.7 % (ref 18–48)
MCH RBC QN AUTO: 29.8 PG (ref 27–31)
MCHC RBC AUTO-ENTMCNC: 33.2 G/DL (ref 32–36)
MCV RBC AUTO: 90 FL (ref 82–98)
MONOCYTES # BLD AUTO: 0.8 K/UL (ref 0.3–1)
MONOCYTES NFR BLD: 8.7 % (ref 4–15)
NEUTROPHILS # BLD AUTO: 6.3 K/UL (ref 1.8–7.7)
NEUTROPHILS NFR BLD: 66.9 % (ref 38–73)
NRBC BLD-RTO: 0 /100 WBC
PLATELET # BLD AUTO: 248 K/UL (ref 150–350)
PMV BLD AUTO: 9 FL (ref 9.2–12.9)
POTASSIUM SERPL-SCNC: 4.5 MMOL/L (ref 3.5–5.1)
PROT SERPL-MCNC: 7.8 G/DL (ref 6–8.4)
RBC # BLD AUTO: 5.13 M/UL (ref 4.6–6.2)
SODIUM SERPL-SCNC: 139 MMOL/L (ref 136–145)
WBC # BLD AUTO: 9.36 K/UL (ref 3.9–12.7)

## 2020-10-09 PROCEDURE — 96365 THER/PROPH/DIAG IV INF INIT: CPT | Mod: 59

## 2020-10-09 PROCEDURE — 87040 BLOOD CULTURE FOR BACTERIA: CPT

## 2020-10-09 PROCEDURE — 86703 HIV-1/HIV-2 1 RESULT ANTBDY: CPT

## 2020-10-09 PROCEDURE — 26010 DRAINAGE OF FINGER ABSCESS: CPT | Mod: F1

## 2020-10-09 PROCEDURE — 86803 HEPATITIS C AB TEST: CPT

## 2020-10-09 PROCEDURE — 85025 COMPLETE CBC W/AUTO DIFF WBC: CPT

## 2020-10-09 PROCEDURE — 80053 COMPREHEN METABOLIC PANEL: CPT

## 2020-10-09 PROCEDURE — 99284 EMERGENCY DEPT VISIT MOD MDM: CPT | Mod: 25

## 2020-10-09 PROCEDURE — 85651 RBC SED RATE NONAUTOMATED: CPT

## 2020-10-09 PROCEDURE — 86140 C-REACTIVE PROTEIN: CPT

## 2020-10-09 PROCEDURE — 63600175 PHARM REV CODE 636 W HCPCS: Performed by: EMERGENCY MEDICINE

## 2020-10-09 PROCEDURE — 25000003 PHARM REV CODE 250: Performed by: EMERGENCY MEDICINE

## 2020-10-09 PROCEDURE — 83605 ASSAY OF LACTIC ACID: CPT

## 2020-10-09 RX ORDER — CLINDAMYCIN PHOSPHATE 900 MG/50ML
900 INJECTION, SOLUTION INTRAVENOUS
Status: COMPLETED | OUTPATIENT
Start: 2020-10-09 | End: 2020-10-09

## 2020-10-09 RX ORDER — CEPHALEXIN 500 MG/1
500 CAPSULE ORAL 4 TIMES DAILY
Qty: 20 CAPSULE | Refills: 0 | Status: SHIPPED | OUTPATIENT
Start: 2020-10-09 | End: 2020-10-14

## 2020-10-09 RX ORDER — LIDOCAINE HYDROCHLORIDE AND EPINEPHRINE 10; 10 MG/ML; UG/ML
5 INJECTION, SOLUTION INFILTRATION; PERINEURAL
Status: COMPLETED | OUTPATIENT
Start: 2020-10-09 | End: 2020-10-09

## 2020-10-09 RX ORDER — DOXYCYCLINE 100 MG/1
100 CAPSULE ORAL 2 TIMES DAILY
Qty: 20 CAPSULE | Refills: 0 | Status: SHIPPED | OUTPATIENT
Start: 2020-10-09 | End: 2020-10-19

## 2020-10-09 RX ADMIN — CLINDAMYCIN PHOSPHATE 900 MG: 900 INJECTION, SOLUTION INTRAVENOUS at 01:10

## 2020-10-09 RX ADMIN — SODIUM CHLORIDE, SODIUM LACTATE, POTASSIUM CHLORIDE, AND CALCIUM CHLORIDE 1000 ML: .6; .31; .03; .02 INJECTION, SOLUTION INTRAVENOUS at 01:10

## 2020-10-09 RX ADMIN — LIDOCAINE HYDROCHLORIDE,EPINEPHRINE BITARTRATE 5 ML: 10; .01 INJECTION, SOLUTION INFILTRATION; PERINEURAL at 01:10

## 2020-10-09 NOTE — ED NOTES
Pt resting in ER stretcher, aaox4, rr e/u, NAD noted. Bed low and locked, call light in reach, side rails up x2. IV fluids and antibiotics infusing.  Pt verbalized understanding of status and POC; denies further needs. Will continue to monitor.

## 2020-10-14 LAB
BACTERIA BLD CULT: NORMAL
BACTERIA BLD CULT: NORMAL

## 2021-06-06 ENCOUNTER — HOSPITAL ENCOUNTER (EMERGENCY)
Facility: HOSPITAL | Age: 51
Discharge: HOME | End: 2021-06-06
Attending: SPECIALIST
Payer: MEDICAID

## 2021-06-06 VITALS
WEIGHT: 114.5 LBS | DIASTOLIC BLOOD PRESSURE: 86 MMHG | HEIGHT: 67 IN | RESPIRATION RATE: 20 BRPM | SYSTOLIC BLOOD PRESSURE: 126 MMHG | OXYGEN SATURATION: 96 % | TEMPERATURE: 99 F | BODY MASS INDEX: 17.97 KG/M2 | HEART RATE: 91 BPM

## 2021-06-06 DIAGNOSIS — L02.413 ABSCESS OF SKIN OF RIGHT WRIST: Primary | ICD-10-CM

## 2021-06-06 PROCEDURE — 99284 EMERGENCY DEPT VISIT MOD MDM: CPT

## 2021-06-06 RX ORDER — DOXYCYCLINE 100 MG/1
100 CAPSULE ORAL 2 TIMES DAILY
Qty: 14 CAPSULE | Refills: 0 | Status: SHIPPED | OUTPATIENT
Start: 2021-06-06 | End: 2021-06-13

## 2021-06-06 RX ORDER — HYDROCODONE BITARTRATE AND ACETAMINOPHEN 5; 325 MG/1; MG/1
1 TABLET ORAL EVERY 6 HOURS PRN
Qty: 14 TABLET | Refills: 0 | Status: SHIPPED | OUTPATIENT
Start: 2021-06-06

## 2021-08-10 ENCOUNTER — OFFICE VISIT (OUTPATIENT)
Dept: URGENT CARE | Facility: CLINIC | Age: 51
End: 2021-08-10
Payer: MEDICAID

## 2021-08-10 VITALS
DIASTOLIC BLOOD PRESSURE: 83 MMHG | HEART RATE: 76 BPM | TEMPERATURE: 98 F | SYSTOLIC BLOOD PRESSURE: 134 MMHG | OXYGEN SATURATION: 96 %

## 2021-08-10 DIAGNOSIS — J34.9 SINUS PROBLEM: ICD-10-CM

## 2021-08-10 DIAGNOSIS — K02.9 DENTAL CARIES: Primary | ICD-10-CM

## 2021-08-10 LAB
CTP QC/QA: YES
SARS-COV-2 RDRP RESP QL NAA+PROBE: NEGATIVE

## 2021-08-10 PROCEDURE — 99214 PR OFFICE/OUTPT VISIT, EST, LEVL IV, 30-39 MIN: ICD-10-PCS | Mod: S$PBB,CS,, | Performed by: PHYSICIAN ASSISTANT

## 2021-08-10 PROCEDURE — 99214 OFFICE O/P EST MOD 30 MIN: CPT | Mod: S$PBB,CS,, | Performed by: PHYSICIAN ASSISTANT

## 2021-08-10 PROCEDURE — 99999 PR PBB SHADOW E&M-EST. PATIENT-LVL III: CPT | Mod: PBBFAC,,, | Performed by: PHYSICIAN ASSISTANT

## 2021-08-10 PROCEDURE — 99213 OFFICE O/P EST LOW 20 MIN: CPT | Mod: PBBFAC,PO | Performed by: PHYSICIAN ASSISTANT

## 2021-08-10 PROCEDURE — 99999 PR PBB SHADOW E&M-EST. PATIENT-LVL III: ICD-10-PCS | Mod: PBBFAC,,, | Performed by: PHYSICIAN ASSISTANT

## 2021-08-10 PROCEDURE — U0002 COVID-19 LAB TEST NON-CDC: HCPCS | Mod: PBBFAC,PO | Performed by: PHYSICIAN ASSISTANT

## 2021-08-10 RX ORDER — CLINDAMYCIN HYDROCHLORIDE 300 MG/1
300 CAPSULE ORAL EVERY 8 HOURS
Qty: 21 CAPSULE | Refills: 0 | Status: SHIPPED | OUTPATIENT
Start: 2021-08-10 | End: 2021-08-17

## 2021-08-10 RX ORDER — GABAPENTIN 100 MG/1
200 CAPSULE ORAL 3 TIMES DAILY PRN
COMMUNITY
Start: 2021-06-22

## 2024-05-27 NOTE — PLAN OF CARE
continue HCTZ, losartan   routine BMP   monitor   Problem: Adult Inpatient Plan of Care  Goal: Plan of Care Review  Outcome: Ongoing (interventions implemented as appropriate)  Plan of care discussed with patient. Possible side effects of medications was discussed. No concerns voiced. Pt denies pain at this time. Pain controled with PO medications, positioning, and relaxation techniques. Ambulating well with assistance. Remains free from injuries. Side rails up, nonskid socks placed, call bell within reach, personal area free from clutter. Will continue to monitor

## (undated) DEVICE — NDL SAFETY 25G X 1.5 ECLIPSE

## (undated) DEVICE — GAUZE SPONGE 4X4 12PLY

## (undated) DEVICE — SEE MEDLINE ITEM 157131

## (undated) DEVICE — DRESSING XEROFORM FOIL PK 1X8

## (undated) DEVICE — SUT PDS II O CT-2 VIL MONO

## (undated) DEVICE — SEE MEDLINE ITEM 152529

## (undated) DEVICE — SUT VICRYL 2-0 CT-1 VCP345H

## (undated) DEVICE — SUT 4-0 ETHILON 18 PS-2

## (undated) DEVICE — SEE MEDLINE ITEM 146298

## (undated) DEVICE — GLOVE 7.5 PROTEXIS PI ORTHO PF

## (undated) DEVICE — SEE MEDLINE ITEM 157117

## (undated) DEVICE — GLOVE PROTEXIS HYDROGEL SZ7.5

## (undated) DEVICE — SWAB CULTURETTE II DUAL

## (undated) DEVICE — SEE MEDLINE ITEM 157027

## (undated) DEVICE — SUT VICRYL ANTIMICRO VIL BR

## (undated) DEVICE — COVER OVERHEAD SURG LT BLUE

## (undated) DEVICE — CORD BIPOLAR ELECTROSURGICAL

## (undated) DEVICE — SEE MEDLINE ITEM 152622

## (undated) DEVICE — BANDAGE CONFORM 3IN STRL

## (undated) DEVICE — ELECTRODE REM PLYHSV RETURN 9

## (undated) DEVICE — GLOVE 7.5 PROTEXIS PI BLUE

## (undated) DEVICE — SUT VICRYL 3-0 OB 36 CT-1

## (undated) DEVICE — SEE MEDLINE ITEM 146308

## (undated) DEVICE — SYR 10CC LUER LOCK

## (undated) DEVICE — SEE MEDLINE ITEM 154981

## (undated) DEVICE — SEE MEDLINE ITEM 157216

## (undated) DEVICE — GLOVE 8 PROTEXIS PI BLUE

## (undated) DEVICE — MANIFOLD 4 PORT

## (undated) DEVICE — TIP SUCTION YANKAUER

## (undated) DEVICE — SOL NS 1000CC

## (undated) DEVICE — SUT ETHICON 3-0 BLK MONO PS

## (undated) DEVICE — SOL NACL IRR 1000ML BTL